# Patient Record
Sex: FEMALE | Race: WHITE | NOT HISPANIC OR LATINO | Employment: FULL TIME | URBAN - METROPOLITAN AREA
[De-identification: names, ages, dates, MRNs, and addresses within clinical notes are randomized per-mention and may not be internally consistent; named-entity substitution may affect disease eponyms.]

---

## 2018-06-19 PROBLEM — Z98.890 S/P GASTRIC SURGERY: Chronic | Status: ACTIVE | Noted: 2018-06-19

## 2018-08-07 PROBLEM — E78.00 ELEVATED LDL CHOLESTEROL LEVEL: Chronic | Status: ACTIVE | Noted: 2018-08-07

## 2019-02-28 PROBLEM — E55.9 VITAMIN D DEFICIENCY: Chronic | Status: ACTIVE | Noted: 2019-02-28

## 2019-02-28 PROBLEM — Z98.890 S/P COLONOSCOPY WITH POLYPECTOMY: Chronic | Status: ACTIVE | Noted: 2019-02-28

## 2021-04-26 ENCOUNTER — PATIENT MESSAGE (OUTPATIENT)
Dept: RESEARCH | Facility: HOSPITAL | Age: 61
End: 2021-04-26

## 2021-11-30 ENCOUNTER — TELEPHONE (OUTPATIENT)
Dept: ORTHOPEDICS | Facility: CLINIC | Age: 61
End: 2021-11-30

## 2021-12-01 ENCOUNTER — TELEPHONE (OUTPATIENT)
Dept: PODIATRY | Facility: CLINIC | Age: 61
End: 2021-12-01

## 2022-11-03 ENCOUNTER — TELEPHONE (OUTPATIENT)
Dept: OBSTETRICS AND GYNECOLOGY | Facility: CLINIC | Age: 62
End: 2022-11-03
Payer: MEDICAID

## 2022-11-03 NOTE — TELEPHONE ENCOUNTER
----- Message from Rosalva Landis MA sent at 11/3/2022  1:44 PM CDT -----  Regarding: pt appt  Dr. Huynh is referring the attached patient to you for evaluation and treatment. (Referral is in Epic System)    We appreciate your assistance in the patient's care and look forward to your findings and recommendations.  Please contact patient to set up an appointment.  If we can be of any assistance, please contact the office.        Sincerely,                           Audrey AGUDELO MA

## 2022-11-18 PROBLEM — R10.13 EPIGASTRIC PAIN: Status: ACTIVE | Noted: 2022-11-18

## 2022-11-22 PROBLEM — Z12.11 SCREEN FOR COLON CANCER: Status: ACTIVE | Noted: 2022-11-22

## 2023-02-22 ENCOUNTER — OFFICE VISIT (OUTPATIENT)
Dept: CARDIOLOGY | Facility: CLINIC | Age: 63
End: 2023-02-22
Payer: COMMERCIAL

## 2023-02-22 VITALS
WEIGHT: 222.69 LBS | HEART RATE: 58 BPM | OXYGEN SATURATION: 98 % | DIASTOLIC BLOOD PRESSURE: 59 MMHG | BODY MASS INDEX: 37.1 KG/M2 | HEIGHT: 65 IN | SYSTOLIC BLOOD PRESSURE: 134 MMHG

## 2023-02-22 DIAGNOSIS — R00.1 SINUS BRADYCARDIA: ICD-10-CM

## 2023-02-22 DIAGNOSIS — K80.80 BILIARY CALCULUS OF OTHER SITE WITHOUT OBSTRUCTION: ICD-10-CM

## 2023-02-22 DIAGNOSIS — E03.9 ACQUIRED HYPOTHYROIDISM: ICD-10-CM

## 2023-02-22 DIAGNOSIS — Z98.890 S/P GASTRIC SURGERY: Chronic | ICD-10-CM

## 2023-02-22 DIAGNOSIS — I70.0 ABDOMINAL AORTIC ATHEROSCLEROSIS: ICD-10-CM

## 2023-02-22 DIAGNOSIS — Z98.890 S/P COLONOSCOPY WITH POLYPECTOMY: Chronic | ICD-10-CM

## 2023-02-22 DIAGNOSIS — R00.1 BRADYCARDIA: ICD-10-CM

## 2023-02-22 DIAGNOSIS — I20.89 OTHER FORMS OF ANGINA PECTORIS: ICD-10-CM

## 2023-02-22 DIAGNOSIS — R06.09 DYSPNEA ON EXERTION: ICD-10-CM

## 2023-02-22 DIAGNOSIS — I44.7 LBBB (LEFT BUNDLE BRANCH BLOCK): Primary | ICD-10-CM

## 2023-02-22 DIAGNOSIS — E78.2 MIXED HYPERLIPIDEMIA: ICD-10-CM

## 2023-02-22 PROBLEM — K80.20 CHOLELITHIASIS: Status: ACTIVE | Noted: 2023-02-22

## 2023-02-22 PROCEDURE — 93000 EKG 12-LEAD: ICD-10-PCS | Mod: S$GLB,,, | Performed by: INTERNAL MEDICINE

## 2023-02-22 PROCEDURE — 3075F SYST BP GE 130 - 139MM HG: CPT | Mod: CPTII,S$GLB,, | Performed by: INTERNAL MEDICINE

## 2023-02-22 PROCEDURE — 99205 PR OFFICE/OUTPT VISIT, NEW, LEVL V, 60-74 MIN: ICD-10-PCS | Mod: S$GLB,,, | Performed by: INTERNAL MEDICINE

## 2023-02-22 PROCEDURE — 1159F PR MEDICATION LIST DOCUMENTED IN MEDICAL RECORD: ICD-10-PCS | Mod: CPTII,S$GLB,, | Performed by: INTERNAL MEDICINE

## 2023-02-22 PROCEDURE — 99999 PR PBB SHADOW E&M-EST. PATIENT-LVL III: CPT | Mod: PBBFAC,,, | Performed by: INTERNAL MEDICINE

## 2023-02-22 PROCEDURE — 3075F PR MOST RECENT SYSTOLIC BLOOD PRESS GE 130-139MM HG: ICD-10-PCS | Mod: CPTII,S$GLB,, | Performed by: INTERNAL MEDICINE

## 2023-02-22 PROCEDURE — 3078F DIAST BP <80 MM HG: CPT | Mod: CPTII,S$GLB,, | Performed by: INTERNAL MEDICINE

## 2023-02-22 PROCEDURE — 3078F PR MOST RECENT DIASTOLIC BLOOD PRESSURE < 80 MM HG: ICD-10-PCS | Mod: CPTII,S$GLB,, | Performed by: INTERNAL MEDICINE

## 2023-02-22 PROCEDURE — 1160F PR REVIEW ALL MEDS BY PRESCRIBER/CLIN PHARMACIST DOCUMENTED: ICD-10-PCS | Mod: CPTII,S$GLB,, | Performed by: INTERNAL MEDICINE

## 2023-02-22 PROCEDURE — 3008F PR BODY MASS INDEX (BMI) DOCUMENTED: ICD-10-PCS | Mod: CPTII,S$GLB,, | Performed by: INTERNAL MEDICINE

## 2023-02-22 PROCEDURE — 1159F MED LIST DOCD IN RCRD: CPT | Mod: CPTII,S$GLB,, | Performed by: INTERNAL MEDICINE

## 2023-02-22 PROCEDURE — 99999 PR PBB SHADOW E&M-EST. PATIENT-LVL III: ICD-10-PCS | Mod: PBBFAC,,, | Performed by: INTERNAL MEDICINE

## 2023-02-22 PROCEDURE — 99205 OFFICE O/P NEW HI 60 MIN: CPT | Mod: S$GLB,,, | Performed by: INTERNAL MEDICINE

## 2023-02-22 PROCEDURE — 93000 ELECTROCARDIOGRAM COMPLETE: CPT | Mod: S$GLB,,, | Performed by: INTERNAL MEDICINE

## 2023-02-22 PROCEDURE — 3008F BODY MASS INDEX DOCD: CPT | Mod: CPTII,S$GLB,, | Performed by: INTERNAL MEDICINE

## 2023-02-22 PROCEDURE — 1160F RVW MEDS BY RX/DR IN RCRD: CPT | Mod: CPTII,S$GLB,, | Performed by: INTERNAL MEDICINE

## 2023-02-22 RX ORDER — ALBUTEROL SULFATE 90 UG/1
AEROSOL, METERED RESPIRATORY (INHALATION)
COMMUNITY
Start: 2023-01-12 | End: 2023-06-29

## 2023-02-22 RX ORDER — ROSUVASTATIN CALCIUM 5 MG/1
5 TABLET, COATED ORAL DAILY
Qty: 90 TABLET | Refills: 3 | Status: SHIPPED | OUTPATIENT
Start: 2023-02-22 | End: 2023-05-04

## 2023-02-22 NOTE — PROGRESS NOTES
Subjective:      Patient ID: Destiny Carson is a 62 y.o. female.    Chief Complaint: Bradycardia (Ref by Dr Huynh)    HPI:  Pt referred by Dr Huynh for a slow pulse noted on routine physical exam.    Pt is not very active.    Pt works as a container .    Pt does a little yardwork..    There is no hx of heart disease.    Pt was dx with LBBB in 2017 and underwent a stress test which was OK.        Review of Systems   Cardiovascular:  Positive for dyspnea on exertion (Chronic, for example when carrying laundry up stairs.), palpitations (Heart occasionally races for a couple of minutes when at rest.) and syncope (Pt last fainted about 5 years associated with complications after a gastric bypass after taking a hot shower.). Negative for chest pain, claudication, irregular heartbeat, leg swelling, near-syncope and orthopnea.      Past Medical History:   Diagnosis Date    Hyperlipidemia     Syncope and collapse     Thyroid disease         Past Surgical History:   Procedure Laterality Date     SECTION      COLONOSCOPY  2022    COLONOSCOPY N/A 2022    Procedure: COLONOSCOPY;  Surgeon: Anders Wilson MD;  Location: [facility];  Service: Endoscopy;  Laterality: N/A;    ESOPHAGOGASTRODUODENOSCOPY      ESOPHAGOGASTRODUODENOSCOPY N/A 2022    Procedure: EGD (ESOPHAGOGASTRODUODENOSCOPY);  Surgeon: Anders Wilson MD;  Location: [facility];  Service: Endoscopy;  Laterality: N/A;    FOOT SURGERY Left     screws pins in foot.    gastric sleeve         Family History   Problem Relation Age of Onset    Diabetes Mother     Heart disease Mother     Dementia Mother     Heart attack Father     Cancer Father         rectal    Hypertension Father     Heart disease Father     Diabetes Father     Kidney disease Brother        Social History     Socioeconomic History    Marital status: Single   Occupational History    Occupation: data input for [employer]     Employer: [employer]  "  Tobacco Use    Smoking status: Former     Packs/day: 0.25     Years: 40.00     Pack years: 10.00     Types: Cigarettes     Quit date: 3/16/2016     Years since quittin.9    Smokeless tobacco: Former     Quit date: 3/16/2016   Substance and Sexual Activity    Alcohol use: Yes     Alcohol/week: 1.0 standard drink     Types: 1 Glasses of wine per week     Comment: occasionally    Drug use: No    Sexual activity: Never     Partners: Male     Birth control/protection: None       Current Outpatient Medications on File Prior to Visit   Medication Sig Dispense Refill    albuterol (PROVENTIL/VENTOLIN HFA) 90 mcg/actuation inhaler TAKE 1 SPRAYS (INHALATION) EVERY 4 HOURS AS NEEDED WHEEZING) FOR 7 DAYS      EScitalopram oxalate (LEXAPRO) 20 MG tablet Take 1 tablet (20 mg total) by mouth once daily. 90 tablet 0    levothyroxine (SYNTHROID) 25 MCG tablet Take 1 tablet (25 mcg total) by mouth before breakfast. 90 tablet 0    [DISCONTINUED] fenofibrate (TRICOR) 48 MG tablet Take 1 tablet (48 mg total) by mouth once daily. 90 tablet 0     Current Facility-Administered Medications on File Prior to Visit   Medication Dose Route Frequency Provider Last Rate Last Admin    lactated ringers infusion   Intravenous Continuous Anders Wilson MD        sodium chloride 0.9% flush 10 mL  10 mL Intravenous PRN Anders Wilson MD           Review of patient's allergies indicates:  No Known Allergies  Objective:     Vitals:    23 0815   BP: (!) 134/59   BP Location: Left arm   Patient Position: Sitting   BP Method: Large (Automatic)   Pulse: (!) 58   SpO2: 98%   Weight: 101 kg (222 lb 10.6 oz)   Height: 5' 5" (1.651 m)        Physical Exam  Vitals reviewed.   Constitutional:       Appearance: She is well-developed.   Eyes:      General: No scleral icterus.  Neck:      Vascular: No carotid bruit or JVD.   Cardiovascular:      Rate and Rhythm: Normal rate and regular rhythm.      Pulses:           Posterior tibial pulses are 2+ on the " right side and 2+ on the left side.      Heart sounds: No murmur heard.    No gallop.   Pulmonary:      Breath sounds: Normal breath sounds.   Musculoskeletal:      Right lower leg: Edema (trivial) present.      Left lower leg: Edema (trivial) present.   Skin:     General: Skin is warm and dry.   Neurological:      Mental Status: She is alert and oriented to person, place, and time.   Psychiatric:         Behavior: Behavior normal.         Thought Content: Thought content normal.         Judgment: Judgment normal.              ECG today: sinus bradycardia at 56 bpm, LBBB, reviewed by me, no significant change compared with ECG 2017     Chol HDL LDL CHOLc) TG   08/08/22 1704 172 44 91.4 183 Important    11/01/21 0707 156 48 77.0 155 Important    01/14/21 0000 211 Important  66 119 Important  146   US Lower Extremity Veins Bilateral  Status: Final result     MyChart Results Release    [employer] Status: Active  Results Release     PACS Images for [employer] Viewer     Show images for US Lower Extremity Veins Bilateral  All Reviewers List    Randell Huynh MD on 5/30/2016 20:53     US Lower Extremity Veins Bilateral  Order: [id number]  Status: Final result      Visible to patient: Yes (not seen)       Next appt: 04/27/2023 at 12:00 PM in Family Medicine (Randell Huynh MD)       Dx: Dependent edema       0 Result Notes      Details    Reading Physician Reading Date Result Priority   Real Merida MD  [phone]  [phone] 5/30/2016      Narrative & Impression  BILATERAL LOWER EXTREMITY VENOUS DOPPLER     Comparison: None     Technique: Grayscale, color Doppler, and spectral Doppler sonographic imaging of the right and left lower extremity venous systems was performed.     Findings: There is normal flow, compression, and augmentation in the veins of the right and left lower extremities from common femoral to calf veins.  IMPRESSION:    No evidence for deep vein thrombosis in the right or left lower  extremity.  ______________________________________      Electronically signed by: Real Merida MD  Date:                                            05/30/16  Time:                                           16:47      US Lower Extrem Arteries Bilat with KIRA (xpd)  Status: Final result     MyChart Results Release    [employer] Status: Active  Results Release     PACS Images for [employer] Viewer     Show images for US Lower Extrem Arteries Bilat with KIRA (xpd)  All Reviewers List    Randell Huynh MD on 5/30/2016 20:53     US Lower Extrem Arteries Bilat with KIRA (xpd)  Order: [id number]  Status: Final result      Visible to patient: Yes (not seen)       Next appt: 04/27/2023 at 12:00 PM in Family Medicine (Randell Huynh MD)       Dx: Dependent edema       0 Result Notes      Details    Reading Physician Reading Date Result Priority   Real Merida MD  [phone]  [phone] 5/30/2016      Narrative & Impression  Bilateral lower extremity arterial Doppler examination with bilateral ankle brachial indices     No comparison     Findings: Multiphasic waveforms are present throughout the right lower extremity from common femoral through the calf arteries.  No high-grade stenosis is identified.  The ankle brachial index is normal at 1.1.     Multiphasic waveforms are present throughout the arteries of the left lower extremity from common femoral through calf arteries.  No significant focal stenosis identified.  The ankle brachial index is normal at 1.1.  IMPRESSION:    No evidence for significant lower extremity arterial obstructive disease.  ______________________________________      Electronically signed by: Real Merida MD  Date:                                            05/30/16     CT Abdomen Pelvis With Contrast  Status: Final result     [employer] Results Release    [employer] Status: Active  Results Release     PACS Images for [facility] [place] Viewer     Show images for CT Abdomen Pelvis With  Contrast  All Reviewers List    Sukhdeep Mata III, MD on 7/31/2022 20:00     CT Abdomen Pelvis With Contrast  Order: [id number]  Status: Final result      Visible to patient: Yes (not seen)       Next appt: 04/27/2023 at 12:00 PM in Family Medicine (Randell Huynh MD)       0 Result Notes      Details    Reading Physician Reading Date Result Priority   Mitchel Lyman MD  [phone]  [phone] 7/31/2022 STAT     Narrative & Impression  EXAMINATION:  CT ABDOMEN PELVIS WITH CONTRAST     CLINICAL HISTORY:  Epigastric pain;     TECHNIQUE:  Low dose axial images, sagittal and coronal reformations were obtained from the lung bases to the pubic symphysis following the IV administration of 75 mL of Omnipaque 350     COMPARISON:  None.     FINDINGS:  Lower chest: Unremarkable.     Liver: Normal contour.  Mild degree of periportal edema suggested.     Gallbladder and bile ducts: Cholelithiasis.  No definite focal pericholecystic inflammatory change.  No findings to suggest biliary ductal dilatation.     Pancreas: Unremarkable.     Spleen: Unremarkable.     Adrenals: Unremarkable.     Kidneys: Nonspecific minor perinephric stranding.     Lymph nodes: No abdominal or pelvic lymphadenopathy.     Bowel and mesentery: Hiatal hernia.  Postoperative changes of the stomach suggest prior sleeve gastrectomy.  No definite evidence of acute bowel obstruction.  Moderate to large volume colonic stool.  Colonic diverticulosis is suggested no definite evidence of diverticulitis.Normal caliber appendix.     Abdominal aorta: Nonaneurysmal.  Atherosclerosis.     Inferior vena cava: Unremarkable.     Free fluid or free air: None.     Pelvis: Unremarkable.     Body wall: Unremarkable.     Bones: No acute abnormality.  Degenerative findings are noted involving the spine.     Impression:     1. Cholelithiasis.  No definite CT evidence of acute cholecystitis.  Correlation advised.  2. Additional details of chronic and incidental  findings as provided in the body of report.        Electronically signed by: Mitchel Lyman  Date:                                            07/31/2022       ComponentRef Range & Units6 mo ago  (8/8/22)6 mo ago  (7/31/22)1 yr ago  (11/1/21)2 yr ago  (1/14/21)4 yr ago  (6/28/18)5 yr ago  (11/30/17)6 yr ago  (2/17/17)WBC3.90 - 12.70 K/uL   8.74   13.16 High    7.60   6.9 R   7.30   6.7 R   7.6 R   RBC4.00 - 5.40 M/uL   4.30   4.38   4.49   4.97 R   4.72   4.54 R   4.47 R   Rgfoaqnrqv73.0 - 16.0 g/dL   12.8   13.1   13.4   14.2 R   14.1   13.3 R   13.0 R   Eahaukpqym36.0 - 48.5 %   39.6   40.5   41.1   44.9 R   41.7   40.1 R   38.3 R   MCV82 - 98 fL   92   93   92   90.3 R   88   88.3 R   85.8 R   MCH27.0 - 31.0 pg   29.8   29.9   29.8   28.6 R   29.8   29.3 R   29.1 R   MCHC32.0 - 36.0 g/dL   32.3   32.3   32.6   31.6 Low    33.8   33.2   33.9   RDW11.5 - 14.5 %   12.0   12.3   12.6   12.6 R   12.9   13.8 R   14.0 R   Klgitkitj564 - 450 K/uL   327   265   282   296 R   283 R   301 R   345 R   MPV9.2 - 12.9 fL   10.0   10.1   10.0   10.5 R   8.2 Low    10.8 R   8.4 R   Immature Granulocytes0.0 - 0.5 %   0.3   0.2   0.3   Gran # (ANC)1.8 - 7.7 K/uL   5.5   11.6 High    4.5   4.4   Immature Grans (Abs)0.00 - 0.04 K/uL   0.03   0.03 CM   0.02 CM   Comment: Mild elevation in immature granulocytes is non specific and   can be seen in a variety of conditions including stress response,   acute inflammation, trauma and pregnancy. Correlation with other   laboratory and clinical findings is essential.   Lymph #1.0 - 4.8 K/uL   2.2   1.1   2.4   2,180 R   2.1   2,144 R   1,892 R   Mono #0.3 - 1.0 K/uL   0.5   0.3   0.5   449 R   0.5   402 R   357 R   Eos #0.0 - 0.5 K/uL   0.5   0.1   0.2   221 R   0.2   174 R   228 R   Baso #0.00 - 0.20 K/uL   0.04 0 Result Notes             Component Ref Range & Units 6 mo ago  (8/8/22) 6 mo ago  (7/31/22) 1 yr ago  (11/1/21) 2 yr ago  (1/14/21) 3 yr ago  (1/21/20) 4 yr ago  (6/28/18) 5 yr  ago  (11/30/17)   Sodium 136 - 145 mmol/L 139  142  145  142 R  144 R  140  143 R    Potassium 3.5 - 5.1 mmol/L 3.9  4.1  4.0  4.3 R  5.2 R  3.8  4.3 R    Chloride 95 - 110 mmol/L 103  106  110  104 R  105 R  104 R  106 R    CO2 23 - 29 mmol/L 27  23  28  31 R  31 R  30 High   27 R    Glucose 70 - 110 mg/dL 95  164 High   91  97 R, CM  95 R, CM  91 R  92 R, CM    BUN 8 - 23 mg/dL 13  14  12 R  17 R  16 R  17 R  14 R    Creatinine 0.5 - 1.4 mg/dL 1.0  1.1  1.1  0.99 R, CM  1.00 R, CM  1.0  0.96 R, CM    Calcium 8.7 - 10.5 mg/dL 9.1  8.7  9.5  9.5 R  9.5 R  8.9 R  9.4 R    Total Protein 6.0 - 8.4 g/dL 7.7  7.0  7.1  7.1 R  6.9 R  7.2  7.1 R    Albumin 3.5 - 5.2 g/dL 3.5  3.5  3.6  4.0 R  4.0 R  3.8  4.0 R    Total Bilirubin 0.1 - 1.0 mg/dL 0.5  0.3 CM  0.4 CM  0.5 R  0.4 R  0.7 R, CM  0.5 R    Comment: For infants and newborns, interpretation of results should be based   on gestational age, weight and in agreement with clinical   observations.     Premature Infant recommended reference ranges:         MyChart Status: Active  Results Release       Contains abnormal data Lipid panel  Order: [id number]  Status: Final result    Visible to patient: Yes (not seen)     Next appt: 04/27/2023 at 12:00 PM in Family Medicine (Randell Huynh MD)     Dx: Elevated cholesterol     0 Result Notes    1  Topic             Component Ref Range & Units 6 mo ago 1 yr ago 2 yr ago 3 yr ago 4 yr ago 5 yr ago 6 yr ago   Cholesterol 120 - 199 mg/dL 172  156 CM  211 High  R  176 R  180 R, CM  160 R  153 R    Comment: The National Cholesterol Education Program (NCEP) has set the   following guidelines (reference ranges) for Cholesterol:   Optimal.....................<200 mg/dL   Borderline High.............200-239 mg/dL   High........................> or = 240 mg/dL    Triglycerides 30 - 150 mg/dL 183 High   155 High  CM  146 R  115 R  84 CM  143 R  96 R    Comment: The National Cholesterol Education Program (NCEP) has set the   following  guidelines (reference values) for triglycerides:   Normal......................<150 mg/dL   Borderline High.............150-199 mg/dL   High........................200-499 mg/dL    HDL 40 - 75 mg/dL 44  48 CM  66 R  61 R  58 CM  51 R  59 R    Comment: The National Cholesterol Education Program (NCEP) has set the   following guidelines (reference values) for HDL Cholesterol:   Low...............<40 mg/dL   Optimal...........>60 mg/dL    LDL Cholesterol 63.0 - 159.0 mg/dL 91.4  77.0 CM  119 High  R, CM  94 R, CM  105 High  R, CM  85 R, CM  75 R, CM    Comment: The National Cholesterol Education Program (NCEP) has set the   following guidelines (reference values) for LDL Cholesterol:   Optimal.......................<130 mg/dL                    Component Ref Range & Units 6 mo ago 1 yr ago 2 yr ago 3 yr ago 4 yr ago 5 yr ago 8 yr ago   TSH 0.400 - 4.000 uIU/mL 2.748  4.378 High   4.57 High  R  2.86 R  3.81 R  1.99 R  3.83 R, CM    Resulting Agency  [facility]              Specimen Collected: 08/08/22 17:04 Last Resulted: 08/08/22 17:51        Lab Flowsheet     Order Details     View Encounter     Lab and Collection Details     Routing    Result History     View Encounter Conversation       CM=Additional comments  R=Reference range differs from displayed range               0 Result Notes    1 HM Topic          Component Ref Range & Units 6 mo ago 1 yr ago 2 yr ago 3 yr ago   Hemoglobin A1C 4.0 - 5.6 % 5.0  5.2 CM  5.2 R, CM  5.3 R, CM    Comment: ADA Screening Guidelines:   5.7-6.4%  Consistent with prediabetes   >or=6.5%  Consistent with diabetes     High levels of fetal hemoglobin interfere with the HbA1C   assay. Heterozygous hemoglobin variants (HbS, HgC, etc)do   not significantly interfere with this assay.          Chol HDL LDL CHOLc) TG   08/08/22 1704 172 44 91.4 183 Important    11/01/21 0707 156 48 77.0 155 Important    01/14/21 0000 211 Important  66 119  Important  146   US Lower Extremity Veins Bilateral  Status: Final result     MyChart Results Release    [employer] Status: Active  Results Release     PACS Images for [facility] [place] Viewer     Show images for US Lower Extremity Veins Bilateral  All Reviewers List    Randell Huynh MD on 5/30/2016 20:53     US Lower Extremity Veins Bilateral  Order: [id number]  Status: Final result      Visible to patient: Yes (not seen)       Next appt: 04/27/2023 at 12:00 PM in Family Medicine (Randell Huynh MD)       Dx: Dependent edema       0 Result Notes      Details    Reading Physician Reading Date Result Priority   Real Merida MD  [phone]  [phone] 5/30/2016      Narrative & Impression  BILATERAL LOWER EXTREMITY VENOUS DOPPLER     Comparison: None     Technique: Grayscale, color Doppler, and spectral Doppler sonographic imaging of the right and left lower extremity venous systems was performed.     Findings: There is normal flow, compression, and augmentation in the veins of the right and left lower extremities from common femoral to calf veins.  IMPRESSION:    No evidence for deep vein thrombosis in the right or left lower extremity.  ______________________________________      Electronically signed by: Real Merida MD  Date:                                            05/30/16  Time:                                           16:47      US Lower Extrem Arteries Bilat with KIRA (xpd)  Status: Final result     MyChart Results Release    [employer] Status: Active  Results Release     PACS Images for [facility] [place] Viewer     Show images for US Lower Extrem Arteries Bilat with KIRA (xpd)  All Reviewers List    Randell Huynh MD on 5/30/2016 20:53     US Lower Extrem Arteries Bilat with KIRA (xpd)  Order: [id number]  Status: Final result      Visible to patient: Yes (not seen)       Next appt: 04/27/2023 at 12:00 PM in Family Medicine (Randell Huynh MD)       Dx: Dependent edema       0 Result  Notes      Details    Reading Physician Reading Date Result Priority   Real Merida MD  [phone]  [phone] 5/30/2016      Narrative & Impression  Bilateral lower extremity arterial Doppler examination with bilateral ankle brachial indices     No comparison     Findings: Multiphasic waveforms are present throughout the right lower extremity from common femoral through the calf arteries.  No high-grade stenosis is identified.  The ankle brachial index is normal at 1.1.     Multiphasic waveforms are present throughout the arteries of the left lower extremity from common femoral through calf arteries.  No significant focal stenosis identified.  The ankle brachial index is normal at 1.1.  IMPRESSION:    No evidence for significant lower extremity arterial obstructive disease.  ______________________________________      Electronically signed by: Real Merida MD  Date:                                            05/30/16     CT Abdomen Pelvis With Contrast  Status: Final result     MyChart Results Release    [employer] Status: Active  Results Release     PACS Images for [employer] Viewer     Show images for CT Abdomen Pelvis With Contrast  All Reviewers List    Sukhdeep Mata III, MD on 7/31/2022 20:00     CT Abdomen Pelvis With Contrast  Order: [id number]  Status: Final result      Visible to patient: Yes (not seen)       Next appt: 04/27/2023 at 12:00 PM in Family Medicine (Randell Huynh MD)       0 Result Notes      Details    Reading Physician Reading Date Result Priority   Mitchel Lyman MD  [phone]  [phone] 7/31/2022 STAT     Narrative & Impression  EXAMINATION:  CT ABDOMEN PELVIS WITH CONTRAST     CLINICAL HISTORY:  Epigastric pain;     TECHNIQUE:  Low dose axial images, sagittal and coronal reformations were obtained from the lung bases to the pubic symphysis following the IV administration of 75 mL of Omnipaque 350     COMPARISON:  None.     FINDINGS:  Lower chest:  Unremarkable.     Liver: Normal contour.  Mild degree of periportal edema suggested.     Gallbladder and bile ducts: Cholelithiasis.  No definite focal pericholecystic inflammatory change.  No findings to suggest biliary ductal dilatation.     Pancreas: Unremarkable.     Spleen: Unremarkable.     Adrenals: Unremarkable.     Kidneys: Nonspecific minor perinephric stranding.     Lymph nodes: No abdominal or pelvic lymphadenopathy.     Bowel and mesentery: Hiatal hernia.  Postoperative changes of the stomach suggest prior sleeve gastrectomy.  No definite evidence of acute bowel obstruction.  Moderate to large volume colonic stool.  Colonic diverticulosis is suggested no definite evidence of diverticulitis.Normal caliber appendix.     Abdominal aorta: Nonaneurysmal.  Atherosclerosis.     Inferior vena cava: Unremarkable.     Free fluid or free air: None.     Pelvis: Unremarkable.     Body wall: Unremarkable.     Bones: No acute abnormality.  Degenerative findings are noted involving the spine.     Impression:     1. Cholelithiasis.  No definite CT evidence of acute cholecystitis.  Correlation advised.  2. Additional details of chronic and incidental findings as provided in the body of report.        Electronically signed by: Mitchel Lyman  Date:                                            07/31/2022         Assessment:     1. LBBB (left bundle branch block)    2. Bradycardia    3. Dyspnea on exertion    4. Sinus bradycardia    5. Acquired hypothyroidism    6. Mixed hyperlipidemia    7. S/P gastric surgery    8. S/P colonoscopy with polypectomy    9. Biliary calculus of other site without obstruction    10. Abdominal aortic atherosclerosis    11. Other forms of angina pectoris      Plan:   Destiny was seen today for bradycardia.    Diagnoses and all orders for this visit:    LBBB (left bundle branch block)  -     IN OFFICE EKG 12-LEAD (to Muse)  -     NM Myocardial Perfusion Spect Multi Pharmacologic; Future  -     Nuclear  Stress Test; Future  -     Holter monitor - 48 hour; Future    Bradycardia  -     Ambulatory referral/consult to Cardiology  -     IN OFFICE EKG 12-LEAD (to Muse)  -     NM Myocardial Perfusion Spect Multi Pharmacologic; Future  -     Nuclear Stress Test; Future  -     Holter monitor - 48 hour; Future    Dyspnea on exertion  -     IN OFFICE EKG 12-LEAD (to Muse)  -     NM Myocardial Perfusion Spect Multi Pharmacologic; Future  -     Nuclear Stress Test; Future  -     Holter monitor - 48 hour; Future    Sinus bradycardia  -     IN OFFICE EKG 12-LEAD (to Muse)  -     NM Myocardial Perfusion Spect Multi Pharmacologic; Future  -     Nuclear Stress Test; Future  -     Holter monitor - 48 hour; Future    Acquired hypothyroidism  -     IN OFFICE EKG 12-LEAD (to Muse)  -     NM Myocardial Perfusion Spect Multi Pharmacologic; Future  -     Nuclear Stress Test; Future  -     Holter monitor - 48 hour; Future    Mixed hyperlipidemia  -     IN OFFICE EKG 12-LEAD (to Muse)  -     NM Myocardial Perfusion Spect Multi Pharmacologic; Future  -     Nuclear Stress Test; Future  -     Holter monitor - 48 hour; Future    S/P gastric surgery  -     IN OFFICE EKG 12-LEAD (to Muse)  -     NM Myocardial Perfusion Spect Multi Pharmacologic; Future  -     Nuclear Stress Test; Future  -     Holter monitor - 48 hour; Future    S/P colonoscopy with polypectomy  -     IN OFFICE EKG 12-LEAD (to Muse)  -     NM Myocardial Perfusion Spect Multi Pharmacologic; Future  -     Nuclear Stress Test; Future  -     Holter monitor - 48 hour; Future    Biliary calculus of other site without obstruction  -     NM Myocardial Perfusion Spect Multi Pharmacologic; Future  -     Nuclear Stress Test; Future  -     Holter monitor - 48 hour; Future    Abdominal aortic atherosclerosis  -     NM Myocardial Perfusion Spect Multi Pharmacologic; Future  -     Nuclear Stress Test; Future  -     Holter monitor - 48 hour; Future    Other forms of angina pectoris  -     NM  Myocardial Perfusion Spect Multi Pharmacologic; Future    Other orders  -     rosuvastatin (CRESTOR) 5 MG tablet; Take 1 tablet (5 mg total) by mouth once daily.    The chronic dyspnea on exertion may simply be due to deconditioning, but due to LBBB and hx of smoking and hyperlipidemia and due to abdominal aortic atherosclerosis will obtain a Lexiscan Cardiolite stress test and a holter monitor.    Due to gallstones will discontinue the fenofibrate and substitute rosuvastatin 5 mg daily    RTC 2 months with lab in 6 weeks already ordered by Dr Huynh    Low carb diet discussed    Walk more    Follow up in about 2 months (around 4/22/2023).

## 2023-06-02 ENCOUNTER — TELEPHONE (OUTPATIENT)
Dept: CARDIOLOGY | Facility: CLINIC | Age: 63
End: 2023-06-02
Payer: COMMERCIAL

## 2023-06-02 NOTE — TELEPHONE ENCOUNTER
----- Message from Nina Franco sent at 6/2/2023  2:45 PM CDT -----  Contact: pt @ [phone]  Destiny Carson calling regarding Appointment Access  (message) for #pt is calling to get 3 part stress test schedule pt was schedule 3/6 but had to cancel,and needs to have test done before 6/12. Asking for call back

## 2023-06-02 NOTE — TELEPHONE ENCOUNTER
LVM for patient, returning your call regarding rescheduling your nuclear stress test.  You will need to call [facility] scheduling at [phone].

## 2023-06-05 ENCOUNTER — TELEPHONE (OUTPATIENT)
Dept: CARDIOLOGY | Facility: CLINIC | Age: 63
End: 2023-06-05
Payer: COMMERCIAL

## 2023-06-05 NOTE — TELEPHONE ENCOUNTER
----- Message from Shahana Hensley sent at 6/5/2023  8:27 AM CDT -----  Regarding: Patient Missed Returning Call  Patient is returning missed call . Pts call back- [phone]

## 2023-06-05 NOTE — TELEPHONE ENCOUNTER
Spoke with patient, informed that [facility] is currently not scheduling patients for nuclear stress and will need to schedule at another location.  Patient verbalized understanding, gave number for [facility] ([phone]).  Patient verbalized understanding and will call in the morning.

## 2023-06-19 ENCOUNTER — HOSPITAL ENCOUNTER (OUTPATIENT)
Dept: CARDIOLOGY | Facility: HOSPITAL | Age: 63
Discharge: HOME | End: 2023-06-19
Attending: INTERNAL MEDICINE
Payer: COMMERCIAL

## 2023-06-19 ENCOUNTER — HOSPITAL ENCOUNTER (OUTPATIENT)
Dept: RADIOLOGY | Facility: HOSPITAL | Age: 63
Discharge: HOME | End: 2023-06-19
Attending: INTERNAL MEDICINE
Payer: COMMERCIAL

## 2023-06-19 ENCOUNTER — TELEPHONE (OUTPATIENT)
Dept: CARDIOLOGY | Facility: CLINIC | Age: 63
End: 2023-06-19

## 2023-06-19 VITALS — HEIGHT: 65 IN | WEIGHT: 220 LBS | BODY MASS INDEX: 36.65 KG/M2

## 2023-06-19 DIAGNOSIS — E78.2 MIXED HYPERLIPIDEMIA: ICD-10-CM

## 2023-06-19 DIAGNOSIS — R00.1 BRADYCARDIA: ICD-10-CM

## 2023-06-19 DIAGNOSIS — I44.7 LBBB (LEFT BUNDLE BRANCH BLOCK): ICD-10-CM

## 2023-06-19 DIAGNOSIS — R06.09 DYSPNEA ON EXERTION: ICD-10-CM

## 2023-06-19 DIAGNOSIS — Z98.890 S/P COLONOSCOPY WITH POLYPECTOMY: ICD-10-CM

## 2023-06-19 DIAGNOSIS — K80.80 BILIARY CALCULUS OF OTHER SITE WITHOUT OBSTRUCTION: ICD-10-CM

## 2023-06-19 DIAGNOSIS — Z98.890 S/P COLONOSCOPY WITH POLYPECTOMY: Chronic | ICD-10-CM

## 2023-06-19 DIAGNOSIS — R00.1 SINUS BRADYCARDIA: ICD-10-CM

## 2023-06-19 DIAGNOSIS — I70.0 ABDOMINAL AORTIC ATHEROSCLEROSIS: ICD-10-CM

## 2023-06-19 DIAGNOSIS — E03.9 ACQUIRED HYPOTHYROIDISM: ICD-10-CM

## 2023-06-19 DIAGNOSIS — Z98.890 S/P GASTRIC SURGERY: Chronic | ICD-10-CM

## 2023-06-19 DIAGNOSIS — Z98.890 S/P GASTRIC SURGERY: ICD-10-CM

## 2023-06-19 DIAGNOSIS — R94.39 ABNORMAL NUCLEAR STRESS TEST: Primary | ICD-10-CM

## 2023-06-19 DIAGNOSIS — I20.89 OTHER FORMS OF ANGINA PECTORIS: ICD-10-CM

## 2023-06-19 LAB
CV PHARM DOSE: 0.4 MG
CV STRESS BASE HR: 44 BPM
DIASTOLIC BLOOD PRESSURE: 69 MMHG
OHS CV CPX 85 PERCENT MAX PREDICTED HEART RATE MALE: 129
OHS CV CPX MAX PREDICTED HEART RATE: 151
OHS CV CPX PATIENT IS FEMALE: 1
OHS CV CPX PATIENT IS MALE: 0
OHS CV CPX PEAK DIASTOLIC BLOOD PRESSURE: 69 MMHG
OHS CV CPX PEAK HEAR RATE: 87 BPM
OHS CV CPX PEAK RATE PRESSURE PRODUCT: NORMAL
OHS CV CPX PEAK SYSTOLIC BLOOD PRESSURE: 145 MMHG
OHS CV CPX PERCENT MAX PREDICTED HEART RATE ACHIEVED: 57
OHS CV CPX RATE PRESSURE PRODUCT PRESENTING: 6380
SYSTOLIC BLOOD PRESSURE: 145 MMHG

## 2023-06-19 PROCEDURE — 93018 CV STRESS TEST I&R ONLY: CPT | Mod: ,,, | Performed by: INTERNAL MEDICINE

## 2023-06-19 PROCEDURE — 93016 CV STRESS TEST SUPVJ ONLY: CPT | Mod: ,,, | Performed by: INTERNAL MEDICINE

## 2023-06-19 PROCEDURE — 93017 CV STRESS TEST TRACING ONLY: CPT

## 2023-06-19 PROCEDURE — 78452 HT MUSCLE IMAGE SPECT MULT: CPT | Mod: TC

## 2023-06-19 PROCEDURE — A9502 TC99M TETROFOSMIN: HCPCS

## 2023-06-19 PROCEDURE — 93016 NUCLEAR STRESS TEST (CUPID ONLY): ICD-10-PCS | Mod: ,,, | Performed by: INTERNAL MEDICINE

## 2023-06-19 PROCEDURE — 93018 NUCLEAR STRESS TEST (CUPID ONLY): ICD-10-PCS | Mod: ,,, | Performed by: INTERNAL MEDICINE

## 2023-06-19 PROCEDURE — 78452 HT MUSCLE IMAGE SPECT MULT: CPT | Mod: 26,,, | Performed by: RADIOLOGY

## 2023-06-19 PROCEDURE — 78452 NM MYOCARDIAL PERFUSION SPECT MULTI PHARM: ICD-10-PCS | Mod: 26,,, | Performed by: RADIOLOGY

## 2023-06-19 RX ORDER — REGADENOSON 0.08 MG/ML
0.4 INJECTION, SOLUTION INTRAVENOUS ONCE
Status: DISCONTINUED | OUTPATIENT
Start: 2023-06-19 | End: 2023-06-27 | Stop reason: HOSPADM

## 2023-06-20 ENCOUNTER — TELEPHONE (OUTPATIENT)
Dept: CARDIOLOGY | Facility: CLINIC | Age: 63
End: 2023-06-20
Payer: COMMERCIAL

## 2023-06-20 NOTE — TELEPHONE ENCOUNTER
Spoke with patient, referred by Dr. Chance for abnormal stress test to Dr. Ng.  Scheduled appointment per patient preference.

## 2023-06-20 NOTE — TELEPHONE ENCOUNTER
----- Message from Nina Franco sent at 6/20/2023 12:27 PM CDT -----  Contact: pt @ [phone]  DORCAS WATTS calling regarding Patient Advice (message) for #pt is calling to get np appt, pt has referral in chart still in pending review. Asking for call back

## 2023-06-29 ENCOUNTER — OFFICE VISIT (OUTPATIENT)
Dept: CARDIOLOGY | Facility: CLINIC | Age: 63
End: 2023-06-29
Payer: COMMERCIAL

## 2023-06-29 VITALS
BODY MASS INDEX: 37.25 KG/M2 | OXYGEN SATURATION: 99 % | HEIGHT: 65 IN | WEIGHT: 223.56 LBS | HEART RATE: 62 BPM | DIASTOLIC BLOOD PRESSURE: 84 MMHG | SYSTOLIC BLOOD PRESSURE: 136 MMHG

## 2023-06-29 DIAGNOSIS — E78.2 MIXED HYPERLIPIDEMIA: ICD-10-CM

## 2023-06-29 DIAGNOSIS — R00.1 SINUS BRADYCARDIA: ICD-10-CM

## 2023-06-29 DIAGNOSIS — I70.0 ABDOMINAL AORTIC ATHEROSCLEROSIS: Primary | ICD-10-CM

## 2023-06-29 DIAGNOSIS — R94.39 ABNORMAL CARDIOVASCULAR STRESS TEST: ICD-10-CM

## 2023-06-29 DIAGNOSIS — I44.7 LBBB (LEFT BUNDLE BRANCH BLOCK): ICD-10-CM

## 2023-06-29 DIAGNOSIS — R94.39 ABNORMAL NUCLEAR STRESS TEST: ICD-10-CM

## 2023-06-29 DIAGNOSIS — E78.5 HYPERLIPIDEMIA, UNSPECIFIED HYPERLIPIDEMIA TYPE: ICD-10-CM

## 2023-06-29 DIAGNOSIS — R06.09 DYSPNEA ON EXERTION: ICD-10-CM

## 2023-06-29 PROCEDURE — 3079F DIAST BP 80-89 MM HG: CPT | Mod: CPTII,S$GLB,, | Performed by: INTERNAL MEDICINE

## 2023-06-29 PROCEDURE — 99999 PR PBB SHADOW E&M-EST. PATIENT-LVL IV: CPT | Mod: PBBFAC,,, | Performed by: INTERNAL MEDICINE

## 2023-06-29 PROCEDURE — 99204 PR OFFICE/OUTPT VISIT, NEW, LEVL IV, 45-59 MIN: ICD-10-PCS | Mod: S$GLB,,, | Performed by: INTERNAL MEDICINE

## 2023-06-29 PROCEDURE — 3079F PR MOST RECENT DIASTOLIC BLOOD PRESSURE 80-89 MM HG: ICD-10-PCS | Mod: CPTII,S$GLB,, | Performed by: INTERNAL MEDICINE

## 2023-06-29 PROCEDURE — 99204 OFFICE O/P NEW MOD 45 MIN: CPT | Mod: S$GLB,,, | Performed by: INTERNAL MEDICINE

## 2023-06-29 PROCEDURE — 3008F BODY MASS INDEX DOCD: CPT | Mod: CPTII,S$GLB,, | Performed by: INTERNAL MEDICINE

## 2023-06-29 PROCEDURE — 3008F PR BODY MASS INDEX (BMI) DOCUMENTED: ICD-10-PCS | Mod: CPTII,S$GLB,, | Performed by: INTERNAL MEDICINE

## 2023-06-29 PROCEDURE — 3075F SYST BP GE 130 - 139MM HG: CPT | Mod: CPTII,S$GLB,, | Performed by: INTERNAL MEDICINE

## 2023-06-29 PROCEDURE — 1159F PR MEDICATION LIST DOCUMENTED IN MEDICAL RECORD: ICD-10-PCS | Mod: CPTII,S$GLB,, | Performed by: INTERNAL MEDICINE

## 2023-06-29 PROCEDURE — 3044F PR MOST RECENT HEMOGLOBIN A1C LEVEL <7.0%: ICD-10-PCS | Mod: CPTII,S$GLB,, | Performed by: INTERNAL MEDICINE

## 2023-06-29 PROCEDURE — 3044F HG A1C LEVEL LT 7.0%: CPT | Mod: CPTII,S$GLB,, | Performed by: INTERNAL MEDICINE

## 2023-06-29 PROCEDURE — 1160F PR REVIEW ALL MEDS BY PRESCRIBER/CLIN PHARMACIST DOCUMENTED: ICD-10-PCS | Mod: CPTII,S$GLB,, | Performed by: INTERNAL MEDICINE

## 2023-06-29 PROCEDURE — 99999 PR PBB SHADOW E&M-EST. PATIENT-LVL IV: ICD-10-PCS | Mod: PBBFAC,,, | Performed by: INTERNAL MEDICINE

## 2023-06-29 PROCEDURE — 3075F PR MOST RECENT SYSTOLIC BLOOD PRESS GE 130-139MM HG: ICD-10-PCS | Mod: CPTII,S$GLB,, | Performed by: INTERNAL MEDICINE

## 2023-06-29 PROCEDURE — 1160F RVW MEDS BY RX/DR IN RCRD: CPT | Mod: CPTII,S$GLB,, | Performed by: INTERNAL MEDICINE

## 2023-06-29 PROCEDURE — 1159F MED LIST DOCD IN RCRD: CPT | Mod: CPTII,S$GLB,, | Performed by: INTERNAL MEDICINE

## 2023-06-29 NOTE — PROGRESS NOTES
[facility]  Cardiology Clinic Note      Chief Complaint  Chief Complaint   Patient presents with    Follow-up     Abnormal nuclear stress test 2023       HPI:      62-year-old female with past medical history status post gastric sleeve, syncope, hypothyroidism, left bundle branch block, prior tobacco, hyperlipidemia, hypertension, MPI reversible apical defect normal EF    Previously seen by another [facility] cardiologist but is new me  Sent for consideration of angiogram given MPI results  Recent labs reviewed  The patient has been having dyspnea on exertion over the past few months associated with occasional chest discomfort at work  The symptoms have been going on for few months  Chest discomfort is not reproducible, not positional, not worse after p.o. or when supine.  Discussed risks and benefits of coronary angiogram and explained the procedure in detail      Medications  Current Outpatient Medications   Medication Sig Dispense Refill    ergocalciferol (VITAMIN D2) 50,000 unit Cap Take 1 capsule (50,000 Units total) by mouth every 7 days. 12 capsule 0    EScitalopram oxalate (LEXAPRO) 20 MG tablet Take 1 tablet (20 mg total) by mouth once daily. 90 tablet 0    levothyroxine (SYNTHROID) 25 MCG tablet Take 1 tablet (25 mcg total) by mouth before breakfast. 90 tablet 0     No current facility-administered medications for this visit.     Facility-Administered Medications Ordered in Other Visits   Medication Dose Route Frequency Provider Last Rate Last Admin    lactated ringers infusion   Intravenous Continuous Anders Wilson MD        sodium chloride 0.9% flush 10 mL  10 mL Intravenous PRN Anders Wilson MD            History  Past Medical History:   Diagnosis Date    Hyperlipidemia     Syncope and collapse     Thyroid disease      Past Surgical History:   Procedure Laterality Date     SECTION      COLONOSCOPY  2022    COLONOSCOPY N/A 2022    Procedure: COLONOSCOPY;   Surgeon: Anders Wilson MD;  Location: [facility] ENDO;  Service: Endoscopy;  Laterality: N/A;    ESOPHAGOGASTRODUODENOSCOPY      ESOPHAGOGASTRODUODENOSCOPY N/A 2022    Procedure: EGD (ESOPHAGOGASTRODUODENOSCOPY);  Surgeon: Anders Wilson MD;  Location: [facility] ENDO;  Service: Endoscopy;  Laterality: N/A;    FOOT SURGERY Left     screws pins in foot.    gastric sleeve       Social History     Socioeconomic History    Marital status: Single   Occupational History    Occupation: data input for [employer]     Employer: [employer]   Tobacco Use    Smoking status: Former     Packs/day: 0.25     Years: 40.00     Pack years: 10.00     Types: Cigarettes     Quit date: 3/16/2016     Years since quittin.2    Smokeless tobacco: Former     Quit date: 3/16/2016   Substance and Sexual Activity    Alcohol use: Yes     Alcohol/week: 1.0 standard drink     Types: 1 Glasses of wine per week     Comment: occasionally    Drug use: No    Sexual activity: Never     Partners: Male     Birth control/protection: None     Social Determinants of Health     Financial Resource Strain: Medium Risk    Difficulty of Paying Living Expenses: Somewhat hard   Food Insecurity: No Food Insecurity    Worried About Running Out of Food in the Last Year: Never true    Ran Out of Food in the Last Year: Never true   Transportation Needs: No Transportation Needs    Lack of Transportation (Medical): No    Lack of Transportation (Non-Medical): No   Physical Activity: Insufficiently Active    Days of Exercise per Week: 2 days    Minutes of Exercise per Session: 20 min   Stress: Stress Concern Present    Feeling of Stress : Very much   Social Connections: Unknown    Frequency of Communication with Friends and Family: More than three times a week    Frequency of Social Gatherings with Friends and Family: More than three times a week    Active Member of Clubs or Organizations: Yes    Attends Club or Organization Meetings: 1 to 4 times per year     Marital Status:    Housing Stability: Low Risk     Unable to Pay for Housing in the Last Year: No    Number of Places Lived in the Last Year: 2    Unstable Housing in the Last Year: No     Family History   Problem Relation Age of Onset    Diabetes Mother     Heart disease Mother     Dementia Mother     Heart attack Father     Cancer Father         rectal    Hypertension Father     Heart disease Father     Diabetes Father     Kidney disease Brother         Allergies  Review of patient's allergies indicates:  No Known Allergies    Review of Systems   Review of Systems   Constitutional: Negative for fever.   HENT:  Negative for nosebleeds.    Eyes:  Negative for visual disturbance.   Cardiovascular:  Positive for chest pain and dyspnea on exertion. Negative for claudication, palpitations and syncope.   Respiratory:  Negative for cough, hemoptysis and wheezing.    Endocrine: Negative for cold intolerance, heat intolerance, polyphagia and polyuria.   Hematologic/Lymphatic: Negative for bleeding problem.   Skin:  Negative for rash.   Musculoskeletal:  Negative for myalgias.   Gastrointestinal:  Negative for hematemesis, hematochezia, nausea and vomiting.   Genitourinary:  Negative for dysuria.   Neurological:  Negative for focal weakness and sensory change.   Psychiatric/Behavioral:  Negative for altered mental status.      Physical Exam  Vitals:    06/29/23 0836   BP: 136/84   Pulse: 62     Wt Readings from Last 1 Encounters:   06/29/23 101.4 kg (223 lb 8.7 oz)     Physical Exam  Constitutional:       General: She is not in acute distress.  HENT:      Head: Normocephalic and atraumatic.      Mouth/Throat:      Mouth: Mucous membranes are moist.   Eyes:      Extraocular Movements: Extraocular movements intact.      Pupils: Pupils are equal, round, and reactive to light.   Neck:      Vascular: No carotid bruit or JVD.   Cardiovascular:      Rate and Rhythm: Normal rate and regular rhythm.      Heart sounds: No  murmur heard.    No friction rub. No gallop.   Pulmonary:      Effort: Pulmonary effort is normal.      Breath sounds: Normal breath sounds.   Abdominal:      Tenderness: There is no abdominal tenderness. There is no guarding or rebound.   Musculoskeletal:      Right lower leg: Edema present.      Left lower leg: Edema present.   Skin:     General: Skin is warm and dry.      Capillary Refill: Capillary refill takes less than 2 seconds.   Neurological:      General: No focal deficit present.      Mental Status: She is alert.   Psychiatric:         Mood and Affect: Mood normal.       Labs  Hospital Outpatient Visit on 06/19/2023   Component Date Value Ref Range Status    85% Max Predicted HR 06/19/2023 129   Final    Max Predicted HR 06/19/2023 151   Final    [facility] CV CPX PATIENT IS MALE 06/19/2023 0.0   Final    [facility] CV CPX PATIENT IS FEMALE 06/19/2023 1.0   Final    Systolic blood pressure 06/19/2023 145  mmHg Final    Diastolic blood pressure 06/19/2023 69  mmHg Final    HR at rest 06/19/2023 44  bpm Final    Peak Systolic BP 06/19/2023 145  mmHg Final    Peak Diatolic BP 06/19/2023 69  mmHg Final    Peak HR 06/19/2023 87  bpm Final    % Max HR Achieved 06/19/2023 57   Final    RPP 06/19/2023 6,380   Final    Peak RPP 06/19/2023 12,615   Final    dose 06/19/2023 0.4  mg Final   Lab Visit on 05/01/2023   Component Date Value Ref Range Status    WBC 05/01/2023 7.04  3.90 - 12.70 K/uL Final    RBC 05/01/2023 4.25  4.00 - 5.40 M/uL Final    Hemoglobin 05/01/2023 12.6  12.0 - 16.0 g/dL Final    Hematocrit 05/01/2023 38.9  37.0 - 48.5 % Final    MCV 05/01/2023 92  82 - 98 fL Final    MCH 05/01/2023 29.6  27.0 - 31.0 pg Final    MCHC 05/01/2023 32.4  32.0 - 36.0 g/dL Final    RDW 05/01/2023 12.4  11.5 - 14.5 % Final    Platelets 05/01/2023 244  150 - 450 K/uL Final    MPV 05/01/2023 9.8  9.2 - 12.9 fL Final    Immature Granulocytes 05/01/2023 0.1  0.0 - 0.5 % Final    Gran # (ANC) 05/01/2023 4.1  1.8 - 7.7 K/uL Final     Immature Grans (Abs) 05/01/2023 0.01  0.00 - 0.04 K/uL Final    Comment: Mild elevation in immature granulocytes is non specific and   can be seen in a variety of conditions including stress response,   acute inflammation, trauma and pregnancy. Correlation with other   laboratory and clinical findings is essential.      Lymph # 05/01/2023 2.1  1.0 - 4.8 K/uL Final    Mono # 05/01/2023 0.5  0.3 - 1.0 K/uL Final    Eos # 05/01/2023 0.2  0.0 - 0.5 K/uL Final    Baso # 05/01/2023 0.05  0.00 - 0.20 K/uL Final    nRBC 05/01/2023 0  0 /100 WBC Final    Gran % 05/01/2023 58.5  38.0 - 73.0 % Final    Lymph % 05/01/2023 30.0  18.0 - 48.0 % Final    Mono % 05/01/2023 7.7  4.0 - 15.0 % Final    Eosinophil % 05/01/2023 3.0  0.0 - 8.0 % Final    Basophil % 05/01/2023 0.7  0.0 - 1.9 % Final    Differential Method 05/01/2023 Automated   Final    Sodium 05/01/2023 141  136 - 145 mmol/L Final    Potassium 05/01/2023 3.9  3.5 - 5.1 mmol/L Final    Chloride 05/01/2023 106  95 - 110 mmol/L Final    CO2 05/01/2023 27  23 - 29 mmol/L Final    Glucose 05/01/2023 106  70 - 110 mg/dL Final    BUN 05/01/2023 15  8 - 23 mg/dL Final    Creatinine 05/01/2023 1.0  0.5 - 1.4 mg/dL Final    Calcium 05/01/2023 8.8  8.7 - 10.5 mg/dL Final    Total Protein 05/01/2023 7.0  6.0 - 8.4 g/dL Final    Albumin 05/01/2023 3.5  3.5 - 5.2 g/dL Final    Total Bilirubin 05/01/2023 0.5  0.1 - 1.0 mg/dL Final    Comment: For infants and newborns, interpretation of results should be based  on gestational age, weight and in agreement with clinical  observations.    Premature Infant recommended reference ranges:  Up to 24 hours.............<8.0 mg/dL  Up to 48 hours............<12.0 mg/dL  3-5 days..................<15.0 mg/dL  6-29 days.................<15.0 mg/dL      Alkaline Phosphatase 05/01/2023 94  55 - 135 U/L Final    AST 05/01/2023 15  10 - 40 U/L Final    ALT 05/01/2023 9 (L)  10 - 44 U/L Final    Anion Gap 05/01/2023 8  8 - 16 mmol/L Final    eGFR  05/01/2023 >60.0  >60 mL/min/1.73 m^2 Final    Cholesterol 05/01/2023 126  120 - 199 mg/dL Final    Comment: The National Cholesterol Education Program (NCEP) has set the  following guidelines (reference ranges) for Cholesterol:  Optimal.....................<200 mg/dL  Borderline High.............200-239 mg/dL  High........................> or = 240 mg/dL      Triglycerides 05/01/2023 101  30 - 150 mg/dL Final    Comment: The National Cholesterol Education Program (NCEP) has set the  following guidelines (reference values) for triglycerides:  Normal......................<150 mg/dL  Borderline High.............150-199 mg/dL  High........................200-499 mg/dL      HDL 05/01/2023 54  40 - 75 mg/dL Final    Comment: The National Cholesterol Education Program (NCEP) has set the  following guidelines (reference values) for HDL Cholesterol:  Low...............<40 mg/dL  Optimal...........>60 mg/dL      LDL Cholesterol 05/01/2023 51.8 (L)  63.0 - 159.0 mg/dL Final    Comment: The National Cholesterol Education Program (NCEP) has set the  following guidelines (reference values) for LDL Cholesterol:  Optimal.......................<130 mg/dL  Borderline High...............130-159 mg/dL  High..........................160-189 mg/dL  Very High.....................>190 mg/dL      HDL/Cholesterol Ratio 05/01/2023 42.9  20.0 - 50.0 % Final    Total Cholesterol/HDL Ratio 05/01/2023 2.3  2.0 - 5.0 Final    Non-HDL Cholesterol 05/01/2023 72  mg/dL Final    Comment: Risk category and Non-HDL cholesterol goals:  Coronary heart disease (CHD)or equivalent (10-year risk of CHD >20%):  Non-HDL cholesterol goal     <130 mg/dL  Two or more CHD risk factors and 10-year risk of CHD <= 20%:  Non-HDL cholesterol goal     <160 mg/dL  0 to 1 CHD risk factor:  Non-HDL cholesterol goal     <190 mg/dL      TSH 05/01/2023 1.582  0.400 - 4.000 uIU/mL Final    Vit D, 25-Hydroxy 05/01/2023 25 (L)  30 - 96 ng/mL Final    Comment: Vitamin D  deficiency.........<10 ng/mL                              Vitamin D insufficiency......10-29 ng/mL       Vitamin D sufficiency........> or equal to 30 ng/mL  Vitamin D toxicity............>100 ng/mL      Hemoglobin A1C 05/01/2023 5.1  4.0 - 5.6 % Final    Comment: ADA Screening Guidelines:  5.7-6.4%  Consistent with prediabetes  >or=6.5%  Consistent with diabetes    High levels of fetal hemoglobin interfere with the HbA1C  assay. Heterozygous hemoglobin variants (HbS, HgC, etc)do  not significantly interfere with this assay.   However, presence of multiple variants may affect accuracy.      Estimated Avg Glucose 05/01/2023 100  68 - 131 mg/dL Final       EKG  EKG 05/2023 sinus bradycardia left bundle-branch block    Echo   No results found for this or any previous visit.    Imaging  Nuclear Stress Test    Result Date: 6/19/2023    The ECG portion of the study is negative for ischemia although technically uninterpretable due to LBBB on resting ECG.   The patient reported no chest pain during the stress test.   There were no arrhythmias during stress.   The nuclear portion of this study will be reported separately.     NM Myocardial Perfusion Spect Multi Pharmacologic    Result Date: 6/19/2023  EXAMINATION: NM MYOCARDIAL PERFUSION SPECT MULTI PHARM CLINICAL HISTORY: Chest pain/anginal equiv, intermediate CAD risk, not treadmill candidate;  Bradycardia, unspecified TECHNIQUE: SPECT-CT images through the heart were acquired at rest following the intravenous administration of 10.1 mCi Tc-99m tetrofosmin. SPECT-CT images were then acquired during a Lexiscan cardiac stress following the intravenous administration of 33 mCi Tc-99m tetrofosmin. The clinical stress and ECG portion of the study will be interpreted separately. COMPARISON: None available FINDINGS: Focal hypokinesis in the anterior wall near the apex.  This corresponds to a small anterior wall perfusion defect near the apex which is slightly larger/more  conspicuous on stress images.  Finding is present on both the attenuation corrected and non corrected images. Stress and rest end diastolic volumes 60 and 43 mL, respectively. Stress and rest end systolic volumes 23 and 19 mL, respectively. Estimated left ventricular ejection fraction 61% during stress and 56% during rest. TID: 1.06 Limited noncontrast CT demonstrates a small hiatal hernia and postop changes of prior gastric sleeve.     1. Suspect a small area reversible ischemia in the anterior wall near the apex with associated focal hypokinesis. 2. A small underlying fixed defect may indicate underlying infarct versus apical thinning. 3. Left ventricular ejection fraction 61% during stress and 56% during rest. This report was flagged in Epic as abnormal. Electronically signed by: Keegan Phoenix Date:    06/19/2023 Time:    16:44      Prior coronary angiogram / intervention:  N/A    Assessment and Plan  1. Abnormal nuclear stress test  Echocardiogram and coronary angiogram  - Echo; Future    2. Abdominal aortic atherosclerosis  The patient will qualify for statin defer to primary cardiologist    3. Dyspnea on exertion / CP  As per 1.  - Echo; Future    4. LBBB (left bundle branch block)  Noted  - Echo; Future    5. Mixed hyperlipidemia  Defer treatment to primary cardiologist    6. Sinus bradycardia  Holter pending      Follow Up  Follow up in about 1 month (around 7/29/2023).      Tarik gN MD, FACC, VI  Interventional Cardiology

## 2023-07-03 ENCOUNTER — TELEPHONE (OUTPATIENT)
Dept: CARDIOLOGY | Facility: CLINIC | Age: 63
End: 2023-07-03
Payer: COMMERCIAL

## 2023-07-03 NOTE — TELEPHONE ENCOUNTER
----- Message from Katie Camacho sent at 7/3/2023 12:38 PM CDT -----  Regarding: reschedule appt  Contact: Destiny Soliman calling to reschedule appointment for procedure on 07/18/2023 to either 07/19,24,27,28/2023  or next week on 07/12,13/14, / 2023. Please advise Requesting a call back.        [phone] (home)

## 2023-07-03 NOTE — TELEPHONE ENCOUNTER
----- Message from Ambrose Yousif sent at 7/3/2023  2:49 PM CDT -----  Contact: pt  Pt requesting call back RE: calling to r/s 7/18 procedure. Please call    Confirmed contact below:  Contact Name:Destiny Carson  Phone Number: [phone]

## 2023-07-03 NOTE — TELEPHONE ENCOUNTER
Spoke with patient, informed none of the dates provided are good dates with the cath lab and provider.  Informed provider is available for procedures on Tuesday mornings only.  Patient will discuss with daughter next availability and call the office back.

## 2023-07-15 ENCOUNTER — TELEPHONE (OUTPATIENT)
Dept: CARDIOLOGY | Facility: CLINIC | Age: 63
End: 2023-07-15
Payer: COMMERCIAL

## 2023-07-15 DIAGNOSIS — R94.39 ABNORMAL CARDIOVASCULAR STRESS TEST: Primary | ICD-10-CM

## 2023-07-15 RX ORDER — ASPIRIN 81 MG/1
81 TABLET ORAL DAILY
Qty: 90 TABLET | Refills: 0 | Status: SHIPPED | OUTPATIENT
Start: 2023-07-15 | End: 2023-09-20

## 2023-07-15 RX ORDER — PRAVASTATIN SODIUM 10 MG/1
10 TABLET ORAL DAILY
Qty: 90 TABLET | Refills: 0 | Status: SHIPPED | OUTPATIENT
Start: 2023-07-15 | End: 2023-08-03 | Stop reason: SDUPTHER

## 2023-07-21 ENCOUNTER — TELEPHONE (OUTPATIENT)
Dept: CARDIOLOGY | Facility: CLINIC | Age: 63
End: 2023-07-21
Payer: COMMERCIAL

## 2023-07-21 NOTE — TELEPHONE ENCOUNTER
----- Message from Malathi Malone sent at 7/21/2023  4:05 PM CDT -----  Regarding: Appt  Contact: Pt [phone]  Pt is calling stating the appt that she has schedule for 7/28, pt wants to know is the appt necessary because she didn't have the other test done that is schedule for 8/15 please call

## 2023-07-21 NOTE — TELEPHONE ENCOUNTER
Spoke with patient, appointment was scheduled in anticipation that angiogram would be scheduled this month.  Rescheduled appointment 2 weeks after angiogram.  Patient verbalized understanding.

## 2023-08-17 ENCOUNTER — TELEPHONE (OUTPATIENT)
Dept: CARDIOLOGY | Facility: CLINIC | Age: 63
End: 2023-08-17
Payer: COMMERCIAL

## 2023-08-17 NOTE — TELEPHONE ENCOUNTER
"Spoke with patient, had angiogram on Tuesday.  She has had a constant headache at the top and base of neck, she has been seeing "specks of light" in peripheral vision, diarrhea yesterday, and mild dizziness - "off balance".  She has been taking 2 tylenol consistently.    Spoke with provider, he advised patient to go to ER for CT of head.  Advised patient of provider recommendation.  Patient verbalized understanding, states if headache comes back she will go to ER.    "

## 2023-08-17 NOTE — TELEPHONE ENCOUNTER
----- Message from Nalini Ramirez sent at 8/17/2023  8:33 AM CDT -----  Regarding: postop issues  The patient called requesting to speak to Nurse states that after procedure on Monday patient has a headache continuing, loosing peripheral vision at times, mild dizziness and diarrhea  Please call to advise at your earliest opportunity    No further information provided      Patient can be contacted @# [phone] (home)

## 2023-08-29 ENCOUNTER — OFFICE VISIT (OUTPATIENT)
Dept: CARDIOLOGY | Facility: CLINIC | Age: 63
End: 2023-08-29
Payer: COMMERCIAL

## 2023-08-29 VITALS
BODY MASS INDEX: 37.73 KG/M2 | HEIGHT: 65 IN | DIASTOLIC BLOOD PRESSURE: 60 MMHG | OXYGEN SATURATION: 98 % | WEIGHT: 226.44 LBS | SYSTOLIC BLOOD PRESSURE: 136 MMHG | HEART RATE: 53 BPM

## 2023-08-29 DIAGNOSIS — E78.00 ELEVATED LDL CHOLESTEROL LEVEL: Chronic | ICD-10-CM

## 2023-08-29 DIAGNOSIS — E78.5 HYPERLIPIDEMIA, UNSPECIFIED HYPERLIPIDEMIA TYPE: ICD-10-CM

## 2023-08-29 DIAGNOSIS — E78.2 MIXED HYPERLIPIDEMIA: Primary | ICD-10-CM

## 2023-08-29 DIAGNOSIS — R06.09 DYSPNEA ON EXERTION: ICD-10-CM

## 2023-08-29 DIAGNOSIS — I25.10 CORONARY ARTERY DISEASE INVOLVING NATIVE CORONARY ARTERY OF NATIVE HEART WITHOUT ANGINA PECTORIS: ICD-10-CM

## 2023-08-29 DIAGNOSIS — I44.7 LBBB (LEFT BUNDLE BRANCH BLOCK): ICD-10-CM

## 2023-08-29 DIAGNOSIS — I27.20 PULMONARY HTN: ICD-10-CM

## 2023-08-29 PROCEDURE — 99214 OFFICE O/P EST MOD 30 MIN: CPT | Mod: S$GLB,,, | Performed by: INTERNAL MEDICINE

## 2023-08-29 PROCEDURE — 1160F PR REVIEW ALL MEDS BY PRESCRIBER/CLIN PHARMACIST DOCUMENTED: ICD-10-PCS | Mod: CPTII,S$GLB,, | Performed by: INTERNAL MEDICINE

## 2023-08-29 PROCEDURE — 3008F PR BODY MASS INDEX (BMI) DOCUMENTED: ICD-10-PCS | Mod: CPTII,S$GLB,, | Performed by: INTERNAL MEDICINE

## 2023-08-29 PROCEDURE — 1159F PR MEDICATION LIST DOCUMENTED IN MEDICAL RECORD: ICD-10-PCS | Mod: CPTII,S$GLB,, | Performed by: INTERNAL MEDICINE

## 2023-08-29 PROCEDURE — 1160F RVW MEDS BY RX/DR IN RCRD: CPT | Mod: CPTII,S$GLB,, | Performed by: INTERNAL MEDICINE

## 2023-08-29 PROCEDURE — 3078F DIAST BP <80 MM HG: CPT | Mod: CPTII,S$GLB,, | Performed by: INTERNAL MEDICINE

## 2023-08-29 PROCEDURE — 3075F PR MOST RECENT SYSTOLIC BLOOD PRESS GE 130-139MM HG: ICD-10-PCS | Mod: CPTII,S$GLB,, | Performed by: INTERNAL MEDICINE

## 2023-08-29 PROCEDURE — 3078F PR MOST RECENT DIASTOLIC BLOOD PRESSURE < 80 MM HG: ICD-10-PCS | Mod: CPTII,S$GLB,, | Performed by: INTERNAL MEDICINE

## 2023-08-29 PROCEDURE — 3075F SYST BP GE 130 - 139MM HG: CPT | Mod: CPTII,S$GLB,, | Performed by: INTERNAL MEDICINE

## 2023-08-29 PROCEDURE — 99999 PR PBB SHADOW E&M-EST. PATIENT-LVL IV: ICD-10-PCS | Mod: PBBFAC,,, | Performed by: INTERNAL MEDICINE

## 2023-08-29 PROCEDURE — 3008F BODY MASS INDEX DOCD: CPT | Mod: CPTII,S$GLB,, | Performed by: INTERNAL MEDICINE

## 2023-08-29 PROCEDURE — 99999 PR PBB SHADOW E&M-EST. PATIENT-LVL IV: CPT | Mod: PBBFAC,,, | Performed by: INTERNAL MEDICINE

## 2023-08-29 PROCEDURE — 3044F HG A1C LEVEL LT 7.0%: CPT | Mod: CPTII,S$GLB,, | Performed by: INTERNAL MEDICINE

## 2023-08-29 PROCEDURE — 1159F MED LIST DOCD IN RCRD: CPT | Mod: CPTII,S$GLB,, | Performed by: INTERNAL MEDICINE

## 2023-08-29 PROCEDURE — 3044F PR MOST RECENT HEMOGLOBIN A1C LEVEL <7.0%: ICD-10-PCS | Mod: CPTII,S$GLB,, | Performed by: INTERNAL MEDICINE

## 2023-08-29 PROCEDURE — 99214 PR OFFICE/OUTPT VISIT, EST, LEVL IV, 30-39 MIN: ICD-10-PCS | Mod: S$GLB,,, | Performed by: INTERNAL MEDICINE

## 2023-08-29 NOTE — PROGRESS NOTES
[facility]  Cardiology Clinic Note      Chief Complaint  Chief Complaint   Patient presents with    Follow-up       HPI:      62-year-old female with past medical history status post gastric sleeve, syncope, hypothyroidism, left bundle branch block, prior tobacco (significant history), hyperlipidemia, hypertension, MPI 06/2023 reversible apical defect normal EF coronary angiogram 08/2023 mild nonobstructive coronary artery disease, echo 07/2023 EF estimated 45-50% diastolic function indeterminate mild mitral valve regurgitation normal RV mild tricuspid regurgitation PASP 40 CVP 8    EF normal and MPI and thought to be mildly decreased low normal on echo  Seen previously for dyspnea and chest discomfort in addition to abnormal MPI taken for coronary angiogram no obstructive disease  Presents for post angiogram visit    Medications  Current Outpatient Medications   Medication Sig Dispense Refill    aspirin (ECOTRIN) 81 MG EC tablet Take 1 tablet (81 mg total) by mouth once daily. 90 tablet 0    ergocalciferol (ERGOCALCIFEROL) 50,000 unit Cap Take 1 capsule (50,000 Units total) by mouth every 7 days. (Patient taking differently: Take 50,000 Units by mouth every 7 days. Vitamin D) 12 capsule 0    EScitalopram oxalate (LEXAPRO) 20 MG tablet Take 1 tablet (20 mg total) by mouth once daily. 90 tablet 0    levothyroxine (SYNTHROID) 25 MCG tablet TAKE 1 TABLET BY MOUTH BEFORE BREAKFAST. 30 tablet 2    pravastatin (PRAVACHOL) 10 MG tablet Take 1 tablet (10 mg total) by mouth once daily. 90 tablet 0     No current facility-administered medications for this visit.     Facility-Administered Medications Ordered in Other Visits   Medication Dose Route Frequency Provider Last Rate Last Admin    lactated ringers infusion   Intravenous Continuous Anders Wilson MD        sodium chloride 0.9% flush 10 mL  10 mL Intravenous PRN Anders Wilson MD            History  Past Medical History:   Diagnosis Date     Hyperlipidemia     Syncope and collapse     Thyroid disease      Past Surgical History:   Procedure Laterality Date    ANGIOGRAM, CORONARY ARTERY  08/15/2023     SECTION      COLONOSCOPY  2022    COLONOSCOPY N/A 2022    Procedure: COLONOSCOPY;  Surgeon: Anders Wilson MD;  Location: [facility] ENDO;  Service: Endoscopy;  Laterality: N/A;    CORONARY ANGIOGRAPHY Right 8/15/2023    Procedure: ANGIOGRAM, CORONARY ARTERY;  Surgeon: Tarik Ng MD;  Location: [facility] CATH LAB;  Service: Cardiology;  Laterality: Right;    ESOPHAGOGASTRODUODENOSCOPY      ESOPHAGOGASTRODUODENOSCOPY N/A 2022    Procedure: EGD (ESOPHAGOGASTRODUODENOSCOPY);  Surgeon: Anders Wilson MD;  Location: [facility] ENDO;  Service: Endoscopy;  Laterality: N/A;    FOOT SURGERY Left     screws pins in foot.    gastric sleeve       Social History     Socioeconomic History    Marital status: Single   Occupational History    Occupation: data input for [employer]     Employer: [employer]   Tobacco Use    Smoking status: Former     Current packs/day: 0.00     Average packs/day: 0.3 packs/day for 40.0 years (10.0 ttl pk-yrs)     Types: Cigarettes     Start date: 3/16/1976     Quit date: 3/16/2016     Years since quittin.4    Smokeless tobacco: Former     Quit date: 3/16/2016   Substance and Sexual Activity    Alcohol use: Yes     Alcohol/week: 1.0 standard drink of alcohol     Types: 1 Glasses of wine per week     Comment: occasionally    Drug use: No    Sexual activity: Never     Partners: Male     Birth control/protection: None     Social Determinants of Health     Financial Resource Strain: Medium Risk (2023)    Overall Financial Resource Strain (CARDIA)     Difficulty of Paying Living Expenses: Somewhat hard   Food Insecurity: No Food Insecurity (2023)    Hunger Vital Sign     Worried About Running Out of Food in the Last Year: Never true     Ran Out of Food in the Last Year: Never true   Transportation  Needs: No Transportation Needs (6/22/2023)    PRAPARE - Transportation     Lack of Transportation (Medical): No     Lack of Transportation (Non-Medical): No   Physical Activity: Insufficiently Active (6/22/2023)    Exercise Vital Sign     Days of Exercise per Week: 2 days     Minutes of Exercise per Session: 20 min   Stress: Stress Concern Present (6/22/2023)    [employer] [place] [employer] - Occupational Stress Questionnaire     Feeling of Stress : Very much   Social Connections: Unknown (6/22/2023)    Social Connection and Isolation Panel [NHANES]     Frequency of Communication with Friends and Family: More than three times a week     Frequency of Social Gatherings with Friends and Family: More than three times a week     Active Member of Clubs or Organizations: Yes     Attends Club or Organization Meetings: 1 to 4 times per year     Marital Status:    Housing Stability: Low Risk  (6/22/2023)    Housing Stability Vital Sign     Unable to Pay for Housing in the Last Year: No     Number of Places Lived in the Last Year: 2     Unstable Housing in the Last Year: No     Family History   Problem Relation Age of Onset    Diabetes Mother     Heart disease Mother     Dementia Mother     Heart attack Father     Cancer Father         rectal    Hypertension Father     Heart disease Father     Diabetes Father     Kidney disease Brother         Allergies  Review of patient's allergies indicates:  No Known Allergies    Review of Systems   Review of Systems   Constitutional: Negative for fever.   HENT:  Negative for nosebleeds.    Eyes:  Negative for visual disturbance.   Cardiovascular:  Positive for chest pain and dyspnea on exertion. Negative for claudication, palpitations and syncope.   Respiratory:  Negative for cough, hemoptysis and wheezing.    Endocrine: Negative for cold intolerance, heat intolerance, polyphagia and polyuria.   Hematologic/Lymphatic: Negative for bleeding problem.   Skin:  Negative  for rash.   Musculoskeletal:  Negative for myalgias.   Gastrointestinal:  Negative for hematemesis, hematochezia, nausea and vomiting.   Genitourinary:  Negative for dysuria.   Neurological:  Negative for focal weakness and sensory change.   Psychiatric/Behavioral:  Negative for altered mental status.        Physical Exam  Vitals:    08/29/23 1258   BP: 136/60   Pulse: (!) 53     Wt Readings from Last 1 Encounters:   08/29/23 102.7 kg (226 lb 6.6 oz)     Physical Exam  Constitutional:       General: She is not in acute distress.  HENT:      Head: Normocephalic and atraumatic.      Mouth/Throat:      Mouth: Mucous membranes are moist.   Eyes:      Extraocular Movements: Extraocular movements intact.      Pupils: Pupils are equal, round, and reactive to light.   Neck:      Vascular: No carotid bruit or JVD.   Cardiovascular:      Rate and Rhythm: Normal rate and regular rhythm.      Heart sounds: No murmur heard.     No friction rub. No gallop.   Pulmonary:      Effort: Pulmonary effort is normal.      Breath sounds: Normal breath sounds.   Abdominal:      Tenderness: There is no abdominal tenderness. There is no guarding or rebound.   Musculoskeletal:      Right lower leg: Edema present.      Left lower leg: Edema present.   Skin:     General: Skin is warm and dry.      Capillary Refill: Capillary refill takes less than 2 seconds.   Neurological:      General: No focal deficit present.      Mental Status: She is alert.   Psychiatric:         Mood and Affect: Mood normal.         Labs  Lab Visit on 08/11/2023   Component Date Value Ref Range Status    WBC 08/11/2023 8.66  3.90 - 12.70 K/uL Final    RBC 08/11/2023 4.54  4.00 - 5.40 M/uL Final    Hemoglobin 08/11/2023 13.3  12.0 - 16.0 g/dL Final    Hematocrit 08/11/2023 40.7  37.0 - 48.5 % Final    MCV 08/11/2023 90  82 - 98 fL Final    MCH 08/11/2023 29.3  27.0 - 31.0 pg Final    MCHC 08/11/2023 32.7  32.0 - 36.0 g/dL Final    RDW 08/11/2023 12.7  11.5 - 14.5 % Final     Platelets 08/11/2023 286  150 - 450 K/uL Final    MPV 08/11/2023 10.0  9.2 - 12.9 fL Final    Immature Granulocytes 08/11/2023 0.1  0.0 - 0.5 % Final    Gran # (ANC) 08/11/2023 5.3  1.8 - 7.7 K/uL Final    Immature Grans (Abs) 08/11/2023 0.01  0.00 - 0.04 K/uL Final    Comment: Mild elevation in immature granulocytes is non specific and   can be seen in a variety of conditions including stress response,   acute inflammation, trauma and pregnancy. Correlation with other   laboratory and clinical findings is essential.      Lymph # 08/11/2023 2.4  1.0 - 4.8 K/uL Final    Mono # 08/11/2023 0.6  0.3 - 1.0 K/uL Final    Eos # 08/11/2023 0.3  0.0 - 0.5 K/uL Final    Baso # 08/11/2023 0.04  0.00 - 0.20 K/uL Final    nRBC 08/11/2023 0  0 /100 WBC Final    Gran % 08/11/2023 61.3  38.0 - 73.0 % Final    Lymph % 08/11/2023 27.7  18.0 - 48.0 % Final    Mono % 08/11/2023 6.8  4.0 - 15.0 % Final    Eosinophil % 08/11/2023 3.6  0.0 - 8.0 % Final    Basophil % 08/11/2023 0.5  0.0 - 1.9 % Final    Differential Method 08/11/2023 Automated   Final    Sodium 08/11/2023 141  136 - 145 mmol/L Final    Potassium 08/11/2023 4.0  3.5 - 5.1 mmol/L Final    Chloride 08/11/2023 107  95 - 110 mmol/L Final    CO2 08/11/2023 23  23 - 29 mmol/L Final    Glucose 08/11/2023 84  70 - 110 mg/dL Final    BUN 08/11/2023 18  8 - 23 mg/dL Final    Creatinine 08/11/2023 1.0  0.5 - 1.4 mg/dL Final    Calcium 08/11/2023 9.3  8.7 - 10.5 mg/dL Final    Anion Gap 08/11/2023 11  8 - 16 mmol/L Final    eGFR 08/11/2023 >60.0  >60 mL/min/1.73 m^2 Final    Prothrombin Time 08/11/2023 10.3  9.0 - 12.5 sec Final    INR 08/11/2023 1.0  0.8 - 1.2 Final    Comment: Coumadin Therapy:  2.0 - 3.0 for INR for all indicators except mechanical heart valves  and antiphospholipid syndromes which should use 2.5 - 3.5.  LOT^040^PT Inn^[id number]      Magnesium 08/11/2023 2.0  1.6 - 2.6 mg/dL Final   Hospital Outpatient Visit on 07/12/2023   Component Date Value Ref Range Status     BSA 07/12/2023 2.15  m2 Final    TDI SEPTAL 07/12/2023 0.08  m/s Final    LV LATERAL E/E' RATIO 07/12/2023 8.90  m/s Final    LV SEPTAL E/E' RATIO 07/12/2023 11.13  m/s Final    IVC diameter 07/12/2023 1.88  cm Final    Left Ventricular Outflow Tract Beba* 07/12/2023 0.64  cm/s Final    Left Ventricular Outflow Tract Beba* 07/12/2023 1.96  mmHg Final    AORTIC VALVE CUSP SEPERATION 07/12/2023 1.75  cm Final    TDI LATERAL 07/12/2023 0.10  m/s Final    PV PEAK VELOCITY 07/12/2023 1.41  cm/s Final    LVIDd 07/12/2023 4.50  3.5 - 6.0 cm Final    IVS 07/12/2023 1.10  0.6 - 1.1 cm Final    Posterior Wall 07/12/2023 1.10 (A)  0.6 - 1.1 cm Final    Ao root annulus 07/12/2023 2.14  cm Final    LVIDs 07/12/2023 3.47  2.1 - 4.0 cm Final    FS 07/12/2023 23  28 - 44 % Final    Sinus 07/12/2023 2.31  cm Final    STJ 07/12/2023 2.33  cm Final    Ascending aorta 07/12/2023 2.24  cm Final    LV mass 07/12/2023 175.02  g Final    LA size 07/12/2023 3.38  cm Final    RVDD 07/12/2023 2.13  cm Final    Left Ventricle Relative Wall Thick* 07/12/2023 0.49  cm Final    AV mean gradient 07/12/2023 5  mmHg Final    AV valve area 07/12/2023 1.54  cm2 Final    AV Velocity Ratio 07/12/2023 0.63   Final    AV index (prosthetic) 07/12/2023 0.68   Final    MV mean gradient 07/12/2023 2  mmHg Final    MV valve area p 1/2 method 07/12/2023 2.61  cm2 Final    MV valve area by continuity eq 07/12/2023 1.35  cm2 Final    E/A ratio 07/12/2023 0.99   Final    Mean e' 07/12/2023 0.09  m/s Final    E wave deceleration time 07/12/2023 247.41  msec Final    LVOT diameter 07/12/2023 1.70  cm Final    LVOT area 07/12/2023 2.3  cm2 Final    LVOT peak rick 07/12/2023 0.95  m/s Final    LVOT peak VTI 07/12/2023 25.30  cm Final    Ao peak rick 07/12/2023 1.51  m/s Final    Ao VTI 07/12/2023 37.3  cm Final    Mr max rick 07/12/2023 5.26  m/s Final    LVOT stroke volume 07/12/2023 57.40  cm3 Final    AV peak gradient 07/12/2023 9  mmHg Final    MV peak gradient  07/12/2023 5  mmHg Final    E/E' ratio 07/12/2023 9.89  m/s Final    MV Peak E Noel 07/12/2023 0.89  m/s Final    TR Max Noel 07/12/2023 2.83  m/s Final    MV VTI 07/12/2023 42.6  cm Final    MV stenosis pressure 1/2 time 07/12/2023 84.29  ms Final    MV Peak A Noel 07/12/2023 0.90  m/s Final    LV Systolic Volume 07/12/2023 49.90  mL Final    LV Systolic Volume Index 07/12/2023 24.1  mL/m2 Final    LV Diastolic Volume 07/12/2023 118.87  mL Final    LV Diastolic Volume Index 07/12/2023 57.43  mL/m2 Final    LV Mass Index 07/12/2023 85  g/m2 Final    RA Major Rifle 07/12/2023 4.19  cm Final    Left Atrium Minor Axis 07/12/2023 3.22  cm Final    Left Atrium Major Axis 07/12/2023 5.11  cm Final    Triscuspid Valve Regurgitation Pea* 07/12/2023 32  mmHg Final    LA Volume Index (Mod) 07/12/2023 30.0  mL/m2 Final    LA volume (mod) 07/12/2023 62.00  cm3 Final    RA Width 07/12/2023 2.89  cm Final    Right Atrial Pressure (from IVC) 07/12/2023 8  mmHg Final    EF 07/12/2023 50  % Final    TV resting pulmonary artery pressu* 07/12/2023 40  mmHg Final   Hospital Outpatient Visit on 06/19/2023   Component Date Value Ref Range Status    85% Max Predicted HR 06/19/2023 129   Final    Max Predicted HR 06/19/2023 151   Final    [facility]HS CV CPX PATIENT IS MALE 06/19/2023 0.0   Final    [facility] CV CPX PATIENT IS FEMALE 06/19/2023 1.0   Final    Systolic blood pressure 06/19/2023 145  mmHg Final    Diastolic blood pressure 06/19/2023 69  mmHg Final    HR at rest 06/19/2023 44  bpm Final    Peak Systolic BP 06/19/2023 145  mmHg Final    Peak Diatolic BP 06/19/2023 69  mmHg Final    Peak HR 06/19/2023 87  bpm Final    % Max HR Achieved 06/19/2023 57   Final    RPP 06/19/2023 6,380   Final    Peak RPP 06/19/2023 12,615   Final    dose 06/19/2023 0.4  mg Final   Lab Visit on 05/01/2023   Component Date Value Ref Range Status    WBC 05/01/2023 7.04  3.90 - 12.70 K/uL Final    RBC 05/01/2023 4.25  4.00 - 5.40 M/uL Final    Hemoglobin 05/01/2023 12.6   12.0 - 16.0 g/dL Final    Hematocrit 05/01/2023 38.9  37.0 - 48.5 % Final    MCV 05/01/2023 92  82 - 98 fL Final    MCH 05/01/2023 29.6  27.0 - 31.0 pg Final    MCHC 05/01/2023 32.4  32.0 - 36.0 g/dL Final    RDW 05/01/2023 12.4  11.5 - 14.5 % Final    Platelets 05/01/2023 244  150 - 450 K/uL Final    MPV 05/01/2023 9.8  9.2 - 12.9 fL Final    Immature Granulocytes 05/01/2023 0.1  0.0 - 0.5 % Final    Gran # (ANC) 05/01/2023 4.1  1.8 - 7.7 K/uL Final    Immature Grans (Abs) 05/01/2023 0.01  0.00 - 0.04 K/uL Final    Comment: Mild elevation in immature granulocytes is non specific and   can be seen in a variety of conditions including stress response,   acute inflammation, trauma and pregnancy. Correlation with other   laboratory and clinical findings is essential.      Lymph # 05/01/2023 2.1  1.0 - 4.8 K/uL Final    Mono # 05/01/2023 0.5  0.3 - 1.0 K/uL Final    Eos # 05/01/2023 0.2  0.0 - 0.5 K/uL Final    Baso # 05/01/2023 0.05  0.00 - 0.20 K/uL Final    nRBC 05/01/2023 0  0 /100 WBC Final    Gran % 05/01/2023 58.5  38.0 - 73.0 % Final    Lymph % 05/01/2023 30.0  18.0 - 48.0 % Final    Mono % 05/01/2023 7.7  4.0 - 15.0 % Final    Eosinophil % 05/01/2023 3.0  0.0 - 8.0 % Final    Basophil % 05/01/2023 0.7  0.0 - 1.9 % Final    Differential Method 05/01/2023 Automated   Final    Sodium 05/01/2023 141  136 - 145 mmol/L Final    Potassium 05/01/2023 3.9  3.5 - 5.1 mmol/L Final    Chloride 05/01/2023 106  95 - 110 mmol/L Final    CO2 05/01/2023 27  23 - 29 mmol/L Final    Glucose 05/01/2023 106  70 - 110 mg/dL Final    BUN 05/01/2023 15  8 - 23 mg/dL Final    Creatinine 05/01/2023 1.0  0.5 - 1.4 mg/dL Final    Calcium 05/01/2023 8.8  8.7 - 10.5 mg/dL Final    Total Protein 05/01/2023 7.0  6.0 - 8.4 g/dL Final    Albumin 05/01/2023 3.5  3.5 - 5.2 g/dL Final    Total Bilirubin 05/01/2023 0.5  0.1 - 1.0 mg/dL Final    Comment: For infants and newborns, interpretation of results should be based  on gestational age, weight  and in agreement with clinical  observations.    Premature Infant recommended reference ranges:  Up to 24 hours.............<8.0 mg/dL  Up to 48 hours............<12.0 mg/dL  3-5 days..................<15.0 mg/dL  6-29 days.................<15.0 mg/dL      Alkaline Phosphatase 05/01/2023 94  55 - 135 U/L Final    AST 05/01/2023 15  10 - 40 U/L Final    ALT 05/01/2023 9 (L)  10 - 44 U/L Final    Anion Gap 05/01/2023 8  8 - 16 mmol/L Final    eGFR 05/01/2023 >60.0  >60 mL/min/1.73 m^2 Final    Cholesterol 05/01/2023 126  120 - 199 mg/dL Final    Comment: The National Cholesterol Education Program (NCEP) has set the  following guidelines (reference ranges) for Cholesterol:  Optimal.....................<200 mg/dL  Borderline High.............200-239 mg/dL  High........................> or = 240 mg/dL      Triglycerides 05/01/2023 101  30 - 150 mg/dL Final    Comment: The National Cholesterol Education Program (NCEP) has set the  following guidelines (reference values) for triglycerides:  Normal......................<150 mg/dL  Borderline High.............150-199 mg/dL  High........................200-499 mg/dL      HDL 05/01/2023 54  40 - 75 mg/dL Final    Comment: The National Cholesterol Education Program (NCEP) has set the  following guidelines (reference values) for HDL Cholesterol:  Low...............<40 mg/dL  Optimal...........>60 mg/dL      LDL Cholesterol 05/01/2023 51.8 (L)  63.0 - 159.0 mg/dL Final    Comment: The National Cholesterol Education Program (NCEP) has set the  following guidelines (reference values) for LDL Cholesterol:  Optimal.......................<130 mg/dL  Borderline High...............130-159 mg/dL  High..........................160-189 mg/dL  Very High.....................>190 mg/dL      HDL/Cholesterol Ratio 05/01/2023 42.9  20.0 - 50.0 % Final    Total Cholesterol/HDL Ratio 05/01/2023 2.3  2.0 - 5.0 Final    Non-HDL Cholesterol 05/01/2023 72  mg/dL Final    Comment: Risk category and  Non-HDL cholesterol goals:  Coronary heart disease (CHD)or equivalent (10-year risk of CHD >20%):  Non-HDL cholesterol goal     <130 mg/dL  Two or more CHD risk factors and 10-year risk of CHD <= 20%:  Non-HDL cholesterol goal     <160 mg/dL  0 to 1 CHD risk factor:  Non-HDL cholesterol goal     <190 mg/dL      TSH 05/01/2023 1.582  0.400 - 4.000 uIU/mL Final    Vit D, 25-Hydroxy 05/01/2023 25 (L)  30 - 96 ng/mL Final    Comment: Vitamin D deficiency.........<10 ng/mL                              Vitamin D insufficiency......10-29 ng/mL       Vitamin D sufficiency........> or equal to 30 ng/mL  Vitamin D toxicity............>100 ng/mL      Hemoglobin A1C 05/01/2023 5.1  4.0 - 5.6 % Final    Comment: ADA Screening Guidelines:  5.7-6.4%  Consistent with prediabetes  >or=6.5%  Consistent with diabetes    High levels of fetal hemoglobin interfere with the HbA1C  assay. Heterozygous hemoglobin variants (HbS, HgC, etc)do  not significantly interfere with this assay.   However, presence of multiple variants may affect accuracy.      Estimated Avg Glucose 05/01/2023 100  68 - 131 mg/dL Final       EKG  EKG 05/2023 sinus bradycardia left bundle-branch block    Echo   Results for orders placed or performed during the hospital encounter of 07/12/23   Echo   Result Value Ref Range    BSA 2.15 m2    TDI SEPTAL 0.08 m/s    LV LATERAL E/E' RATIO 8.90 m/s    LV SEPTAL E/E' RATIO 11.13 m/s    IVC diameter 1.88 cm    Left Ventricular Outflow Tract Mean Velocity 0.64 cm/s    Left Ventricular Outflow Tract Mean Gradient 1.96 mmHg    AORTIC VALVE CUSP SEPERATION 1.75 cm    TDI LATERAL 0.10 m/s    PV PEAK VELOCITY 1.41 cm/s    LVIDd 4.50 3.5 - 6.0 cm    IVS 1.10 0.6 - 1.1 cm    Posterior Wall 1.10 (A) 0.6 - 1.1 cm    Ao root annulus 2.14 cm    LVIDs 3.47 2.1 - 4.0 cm    FS 23 28 - 44 %    Sinus 2.31 cm    STJ 2.33 cm    Ascending aorta 2.24 cm    LV mass 175.02 g    LA size 3.38 cm    RVDD 2.13 cm    Left Ventricle Relative Wall  Thickness 0.49 cm    AV mean gradient 5 mmHg    AV valve area 1.54 cm2    AV Velocity Ratio 0.63     AV index (prosthetic) 0.68     MV mean gradient 2 mmHg    MV valve area p 1/2 method 2.61 cm2    MV valve area by continuity eq 1.35 cm2    E/A ratio 0.99     Mean e' 0.09 m/s    E wave deceleration time 247.41 msec    LVOT diameter 1.70 cm    LVOT area 2.3 cm2    LVOT peak noel 0.95 m/s    LVOT peak VTI 25.30 cm    Ao peak noel 1.51 m/s    Ao VTI 37.3 cm    Mr max noel 5.26 m/s    LVOT stroke volume 57.40 cm3    AV peak gradient 9 mmHg    MV peak gradient 5 mmHg    E/E' ratio 9.89 m/s    MV Peak E Noel 0.89 m/s    TR Max Noel 2.83 m/s    MV VTI 42.6 cm    MV stenosis pressure 1/2 time 84.29 ms    MV Peak A Noel 0.90 m/s    LV Systolic Volume 49.90 mL    LV Systolic Volume Index 24.1 mL/m2    LV Diastolic Volume 118.87 mL    LV Diastolic Volume Index 57.43 mL/m2    LV Mass Index 85 g/m2    RA Major Axis 4.19 cm    Left Atrium Minor Axis 3.22 cm    Left Atrium Major Axis 5.11 cm    Triscuspid Valve Regurgitation Peak Gradient 32 mmHg    LA Volume Index (Mod) 30.0 mL/m2    LA volume (mod) 62.00 cm3    RA Width 2.89 cm    Right Atrial Pressure (from IVC) 8 mmHg    EF 50 %    TV resting pulmonary artery pressure 40 mmHg    Narrative    · The left ventricle is normal in size with mild concentric hypertrophy.  · The estimated ejection fraction is 45-50%.  · Indeterminate left ventricular diastolic function.  · Mild mitral regurgitation.  · Normal right ventricular size with normal right ventricular systolic   function.  · Mild tricuspid regurgitation.  · The estimated PA systolic pressure is 40 mmHg.  · Intermediate central venous pressure (8 mmHg).          Imaging  Nuclear Stress Test    Result Date: 6/19/2023    The ECG portion of the study is negative for ischemia although technically uninterpretable due to LBBB on resting ECG.   The patient reported no chest pain during the stress test.   There were no arrhythmias during  stress.   The nuclear portion of this study will be reported separately.     NM Myocardial Perfusion Spect Multi Pharmacologic    Result Date: 6/19/2023  EXAMINATION: NM MYOCARDIAL PERFUSION SPECT MULTI PHARM CLINICAL HISTORY: Chest pain/anginal equiv, intermediate CAD risk, not treadmill candidate;  Bradycardia, unspecified TECHNIQUE: SPECT-CT images through the heart were acquired at rest following the intravenous administration of 10.1 mCi Tc-99m tetrofosmin. SPECT-CT images were then acquired during a Lexiscan cardiac stress following the intravenous administration of 33 mCi Tc-99m tetrofosmin. The clinical stress and ECG portion of the study will be interpreted separately. COMPARISON: None available FINDINGS: Focal hypokinesis in the anterior wall near the apex.  This corresponds to a small anterior wall perfusion defect near the apex which is slightly larger/more conspicuous on stress images.  Finding is present on both the attenuation corrected and non corrected images. Stress and rest end diastolic volumes 60 and 43 mL, respectively. Stress and rest end systolic volumes 23 and 19 mL, respectively. Estimated left ventricular ejection fraction 61% during stress and 56% during rest. TID: 1.06 Limited noncontrast CT demonstrates a small hiatal hernia and postop changes of prior gastric sleeve.     1. Suspect a small area reversible ischemia in the anterior wall near the apex with associated focal hypokinesis. 2. A small underlying fixed defect may indicate underlying infarct versus apical thinning. 3. Left ventricular ejection fraction 61% during stress and 56% during rest. This report was flagged in Epic as abnormal. Electronically signed by: Keegan Phoenix Date:    06/19/2023 Time:    16:44      Prior coronary angiogram / intervention:  N/A    Assessment and Plan  1. Coronary artery disease  Mild nonobstructive on angiogram  False-positive stress test  Continue aspirin statin    2. Dyspnea on exertion   Refer  to pulmonology  Significant smoking history  PASP 40 (borderline pHTN)    4. LBBB (left bundle branch block)  Noted    5. Mixed hyperlipidemia  Continue statin    6. Sinus bradycardia  Holter pending    Follow Up  3 months    Total professional time spent for the encounter: 30 minutes  Time was spent preparing to see the patient, reviewing results of prior testing, obtaining and/or reviewing separately obtained history, performing a medically appropriate examination and interview, counseling and educating the patient/family, ordering medications/tests/procedures, referring and communicating with other health care professionals, documenting clinical information in the electronic health record, and independently interpreting results.      Tarik Ng MD, FACC, RPVI  Interventional Cardiology

## 2023-09-19 DIAGNOSIS — R94.39 ABNORMAL CARDIOVASCULAR STRESS TEST: ICD-10-CM

## 2023-09-20 RX ORDER — ASPIRIN 81 MG/1
81 TABLET ORAL
Qty: 90 TABLET | Refills: 3 | Status: SHIPPED | OUTPATIENT
Start: 2023-09-20

## 2023-09-26 ENCOUNTER — OFFICE VISIT (OUTPATIENT)
Dept: PULMONOLOGY | Facility: CLINIC | Age: 63
End: 2023-09-26
Payer: COMMERCIAL

## 2023-09-26 VITALS
SYSTOLIC BLOOD PRESSURE: 108 MMHG | BODY MASS INDEX: 37.83 KG/M2 | DIASTOLIC BLOOD PRESSURE: 68 MMHG | HEART RATE: 72 BPM | OXYGEN SATURATION: 97 % | WEIGHT: 227.06 LBS | HEIGHT: 65 IN

## 2023-09-26 DIAGNOSIS — Z87.891 PERSONAL HISTORY OF NICOTINE DEPENDENCE: Primary | ICD-10-CM

## 2023-09-26 DIAGNOSIS — R06.09 DYSPNEA ON EXERTION: ICD-10-CM

## 2023-09-26 DIAGNOSIS — E66.01 CLASS 2 SEVERE OBESITY WITH SERIOUS COMORBIDITY AND BODY MASS INDEX (BMI) OF 37.0 TO 37.9 IN ADULT, UNSPECIFIED OBESITY TYPE: ICD-10-CM

## 2023-09-26 DIAGNOSIS — I27.20 PULMONARY HTN: ICD-10-CM

## 2023-09-26 PROCEDURE — 99999 PR PBB SHADOW E&M-EST. PATIENT-LVL IV: ICD-10-PCS | Mod: PBBFAC,,, | Performed by: INTERNAL MEDICINE

## 2023-09-26 PROCEDURE — 99204 PR OFFICE/OUTPT VISIT, NEW, LEVL IV, 45-59 MIN: ICD-10-PCS | Mod: S$PBB,,, | Performed by: INTERNAL MEDICINE

## 2023-09-26 PROCEDURE — 99999 PR PBB SHADOW E&M-EST. PATIENT-LVL IV: CPT | Mod: PBBFAC,,, | Performed by: INTERNAL MEDICINE

## 2023-09-26 PROCEDURE — 3074F SYST BP LT 130 MM HG: CPT | Mod: CPTII,S$PBB,, | Performed by: INTERNAL MEDICINE

## 2023-09-26 PROCEDURE — 3008F BODY MASS INDEX DOCD: CPT | Mod: CPTII,S$PBB,, | Performed by: INTERNAL MEDICINE

## 2023-09-26 PROCEDURE — 3074F PR MOST RECENT SYSTOLIC BLOOD PRESSURE < 130 MM HG: ICD-10-PCS | Mod: CPTII,S$PBB,, | Performed by: INTERNAL MEDICINE

## 2023-09-26 PROCEDURE — 3044F PR MOST RECENT HEMOGLOBIN A1C LEVEL <7.0%: ICD-10-PCS | Mod: CPTII,S$PBB,, | Performed by: INTERNAL MEDICINE

## 2023-09-26 PROCEDURE — 1159F PR MEDICATION LIST DOCUMENTED IN MEDICAL RECORD: ICD-10-PCS | Mod: CPTII,S$PBB,, | Performed by: INTERNAL MEDICINE

## 2023-09-26 PROCEDURE — 3008F PR BODY MASS INDEX (BMI) DOCUMENTED: ICD-10-PCS | Mod: CPTII,S$PBB,, | Performed by: INTERNAL MEDICINE

## 2023-09-26 PROCEDURE — 99204 OFFICE O/P NEW MOD 45 MIN: CPT | Mod: S$PBB,,, | Performed by: INTERNAL MEDICINE

## 2023-09-26 PROCEDURE — 1159F MED LIST DOCD IN RCRD: CPT | Mod: CPTII,S$PBB,, | Performed by: INTERNAL MEDICINE

## 2023-09-26 PROCEDURE — 3078F DIAST BP <80 MM HG: CPT | Mod: CPTII,S$PBB,, | Performed by: INTERNAL MEDICINE

## 2023-09-26 PROCEDURE — 3044F HG A1C LEVEL LT 7.0%: CPT | Mod: CPTII,S$PBB,, | Performed by: INTERNAL MEDICINE

## 2023-09-26 PROCEDURE — 3078F PR MOST RECENT DIASTOLIC BLOOD PRESSURE < 80 MM HG: ICD-10-PCS | Mod: CPTII,S$PBB,, | Performed by: INTERNAL MEDICINE

## 2023-09-26 RX ORDER — TRIAMCINOLONE ACETONIDE 1 MG/G
CREAM TOPICAL 2 TIMES DAILY
COMMUNITY
Start: 2023-09-06

## 2023-09-26 RX ORDER — LORATADINE 10 MG/1
10 TABLET ORAL
COMMUNITY
Start: 2023-09-20

## 2023-09-26 NOTE — PROGRESS NOTES
History & Physical  [facility]    SUBJECTIVE:     Chief Complaint:   Pulmonary Hypertension    History of Present Illness:  Destiny Carson is a 62 y.o. female who presents for evaluation of pulmonary hypertension (referred from cardiology).    The pt notes chronic shortness of breath. It doesn't bother her very much. Her reason for evaluation is more just to make sure there isn't anything wrong with her. She is not bothered by cough symptoms.    Started smoking at 17 years-old. She quit smoking at 57 years-old. On average, she smoked 1 PPD. This yields a history of ~40 pack-years.    No history of childhood asthma.    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    PMH: gastric sleeve surgery    Review of patient's allergies indicates:  No Known Allergies    Past Medical History:   Diagnosis Date    Coronary artery disease involving native coronary artery of native heart without angina pectoris 2023    Hyperlipidemia     Syncope and collapse     Thyroid disease      Past Surgical History:   Procedure Laterality Date    ANGIOGRAM, CORONARY ARTERY  08/15/2023     SECTION      COLONOSCOPY  2022    COLONOSCOPY N/A 2022    Procedure: COLONOSCOPY;  Surgeon: Anders Wilson MD;  Location: [facility] ENDO;  Service: Endoscopy;  Laterality: N/A;    CORONARY ANGIOGRAPHY Right 8/15/2023    Procedure: ANGIOGRAM, CORONARY ARTERY;  Surgeon: Tarik Ng MD;  Location: [facility] CATH LAB;  Service: Cardiology;  Laterality: Right;    ESOPHAGOGASTRODUODENOSCOPY      ESOPHAGOGASTRODUODENOSCOPY N/A 2022    Procedure: EGD (ESOPHAGOGASTRODUODENOSCOPY);  Surgeon: Anders Wilson MD;  Location: [facility] ENDO;  Service: Endoscopy;  Laterality: N/A;    FOOT SURGERY Left     screws pins in foot.    gastric sleeve  2017     Family History   Problem Relation Age of Onset    Diabetes Mother     Heart disease Mother     Dementia Mother     Heart attack Father     Cancer Father         rectal    Hypertension Father      Heart disease Father     Diabetes Father     Kidney disease Brother      Social History     Socioeconomic History    Marital status: Single   Occupational History    Occupation: data input for [employer]     Employer: [employer]   Tobacco Use    Smoking status: Former     Current packs/day: 1.00     Average packs/day: 1 pack/day for 40.0 years (40.0 ttl pk-yrs)     Types: Cigarettes    Smokeless tobacco: Former     Quit date: 3/16/2016   Substance and Sexual Activity    Alcohol use: Yes     Alcohol/week: 1.0 standard drink of alcohol     Types: 1 Glasses of wine per week     Comment: occasionally    Drug use: No    Sexual activity: Never     Partners: Male     Birth control/protection: None     Social Determinants of Health     Financial Resource Strain: Medium Risk (6/22/2023)    Overall Financial Resource Strain (CARDIA)     Difficulty of Paying Living Expenses: Somewhat hard   Food Insecurity: No Food Insecurity (6/22/2023)    Hunger Vital Sign     Worried About Running Out of Food in the Last Year: Never true     Ran Out of Food in the Last Year: Never true   Transportation Needs: No Transportation Needs (6/22/2023)    PRAPARE - Transportation     Lack of Transportation (Medical): No     Lack of Transportation (Non-Medical): No   Physical Activity: Insufficiently Active (6/22/2023)    Exercise Vital Sign     Days of Exercise per Week: 2 days     Minutes of Exercise per Session: 20 min   Stress: Stress Concern Present (6/22/2023)    [place] [employer] - Occupational Stress Questionnaire     Feeling of Stress : Very much   Social Connections: Unknown (6/22/2023)    Social Connection and Isolation Panel [NHANES]     Frequency of Communication with Friends and Family: More than three times a week     Frequency of Social Gatherings with Friends and Family: More than three times a week     Active Member of Clubs or Organizations: Yes     Attends Club or Organization Meetings: 1 to 4  "times per year     Marital Status:    Housing Stability: Low Risk  (6/22/2023)    Housing Stability Vital Sign     Unable to Pay for Housing in the Last Year: No     Number of Places Lived in the Last Year: 2     Unstable Housing in the Last Year: No     Review of Systems:  No pertinent positives.    OBJECTIVE:     Vital Signs  Vitals:    09/26/23 1315   BP: 108/68   BP Location: Right arm   Patient Position: Sitting   BP Method: Medium (Automatic)   Pulse: 72   SpO2: 97%   Weight: 103 kg (227 lb 1.2 oz)   Height: 5' 5" (1.651 m)     Body mass index is 37.79 kg/m².    Physical Exam:  General: no distress  Eyes:  conjunctivae/corneas clear  Nose: no discharge  Neck: trachea midline with no masses appreciated  Lungs:  normal respiratory effort, no wheezes, no rales  Heart: regular rate and rhythm and no murmur  Abdomen: non-distended  Extremities: no cyanosis, no edema, no clubbing  Skin: No rashes or lesions. good skin turgor  Neurologic: alert, oriented, thought content appropriate    Laboratory:  Lab Results   Component Value Date    WBC 8.66 08/11/2023    HGB 13.3 08/11/2023    HCT 40.7 08/11/2023    MCV 90 08/11/2023     08/11/2023     Chest Imaging, My Impression:   No xray on file    Diagnostic Results:      No PFT on file.    ASSESSMENT/PLAN:     Shortness of breath  - Recommend PFT now.    Obesity  - History of gastric sleeve. Counseled pt on weight loss & lung function.    Pulmonary hypertension  - Minimal symptoms. PASP is mildly elevated. Recommend repeat echo in two years.    Personal History of cigarette nicotine dependence  - Recommend annual low dose CT to begin now.    Blake Cueva MD  [facility]      "

## 2023-09-28 ENCOUNTER — OFFICE VISIT (OUTPATIENT)
Dept: PODIATRY | Facility: CLINIC | Age: 63
End: 2023-09-28
Payer: COMMERCIAL

## 2023-09-28 VITALS
DIASTOLIC BLOOD PRESSURE: 74 MMHG | BODY MASS INDEX: 37.87 KG/M2 | SYSTOLIC BLOOD PRESSURE: 141 MMHG | HEART RATE: 55 BPM | WEIGHT: 227.31 LBS | HEIGHT: 65 IN

## 2023-09-28 DIAGNOSIS — M79.672 ARCH PAIN OF LEFT FOOT: Primary | ICD-10-CM

## 2023-09-28 DIAGNOSIS — M12.572 TRAUMATIC ARTHROPATHY OF LEFT FOOT: ICD-10-CM

## 2023-09-28 DIAGNOSIS — S93.325S LISFRANC DISLOCATION, LEFT, SEQUELA: ICD-10-CM

## 2023-09-28 DIAGNOSIS — M72.2 PLANTAR FASCIITIS: ICD-10-CM

## 2023-09-28 DIAGNOSIS — Z98.890 HISTORY OF OPEN REDUCTION AND INTERNAL FIXATION (ORIF) PROCEDURE: ICD-10-CM

## 2023-09-28 PROCEDURE — 3077F SYST BP >= 140 MM HG: CPT | Mod: CPTII,S$GLB,, | Performed by: PODIATRIST

## 2023-09-28 PROCEDURE — 99203 PR OFFICE/OUTPT VISIT, NEW, LEVL III, 30-44 MIN: ICD-10-PCS | Mod: S$GLB,,, | Performed by: PODIATRIST

## 2023-09-28 PROCEDURE — 99203 OFFICE O/P NEW LOW 30 MIN: CPT | Mod: S$GLB,,, | Performed by: PODIATRIST

## 2023-09-28 PROCEDURE — 1159F PR MEDICATION LIST DOCUMENTED IN MEDICAL RECORD: ICD-10-PCS | Mod: CPTII,S$GLB,, | Performed by: PODIATRIST

## 2023-09-28 PROCEDURE — 99999 PR PBB SHADOW E&M-EST. PATIENT-LVL IV: CPT | Mod: PBBFAC,,, | Performed by: PODIATRIST

## 2023-09-28 PROCEDURE — 3008F BODY MASS INDEX DOCD: CPT | Mod: CPTII,S$GLB,, | Performed by: PODIATRIST

## 2023-09-28 PROCEDURE — 3044F HG A1C LEVEL LT 7.0%: CPT | Mod: CPTII,S$GLB,, | Performed by: PODIATRIST

## 2023-09-28 PROCEDURE — 1159F MED LIST DOCD IN RCRD: CPT | Mod: CPTII,S$GLB,, | Performed by: PODIATRIST

## 2023-09-28 PROCEDURE — 99999 PR PBB SHADOW E&M-EST. PATIENT-LVL IV: ICD-10-PCS | Mod: PBBFAC,,, | Performed by: PODIATRIST

## 2023-09-28 PROCEDURE — 3008F PR BODY MASS INDEX (BMI) DOCUMENTED: ICD-10-PCS | Mod: CPTII,S$GLB,, | Performed by: PODIATRIST

## 2023-09-28 PROCEDURE — 3077F PR MOST RECENT SYSTOLIC BLOOD PRESSURE >= 140 MM HG: ICD-10-PCS | Mod: CPTII,S$GLB,, | Performed by: PODIATRIST

## 2023-09-28 PROCEDURE — 3078F PR MOST RECENT DIASTOLIC BLOOD PRESSURE < 80 MM HG: ICD-10-PCS | Mod: CPTII,S$GLB,, | Performed by: PODIATRIST

## 2023-09-28 PROCEDURE — 3044F PR MOST RECENT HEMOGLOBIN A1C LEVEL <7.0%: ICD-10-PCS | Mod: CPTII,S$GLB,, | Performed by: PODIATRIST

## 2023-09-28 PROCEDURE — 3078F DIAST BP <80 MM HG: CPT | Mod: CPTII,S$GLB,, | Performed by: PODIATRIST

## 2023-09-28 NOTE — PATIENT INSTRUCTIONS
Pick one & use for at least 3-6 months (for your fungal nails):    1. Listerine mouthwash (yellow/gold) - soak for 5-10minutes daily (may re-use solution up to a week)    2. Vicks Vaporub to nails daily after a bath/end of day/bedtime.    3. Lamisil (terbanafine) 1% antifungal cream daily to nails after shower.         Voltaren gel 1% to L foot up to four times a day        Visco-gel Achilles protection sleeve - large/Xlarge    Visco-gel universal metatarsal strap - small/medium left

## 2023-09-28 NOTE — PROGRESS NOTES
Subjective:      Patient ID: Destiny Carson is a 62 y.o. female.    Chief Complaint: Foot Pain    Destiny is a 62 y.o. female who presents new to the podiatry clinic  w/ c/o L foot pain & numbness. Onset of the symptoms was  Nov.2 yrs.ago when fell & broke her foot. Had surgery @ [facility] Dec.2021. Since then, apparently has feeling of a loose staple & foot stays swollen - shoe pressur w/ shoe laces hurt foot. Also, pain w/ ambulation over uneven surfaces.  Pain level 6/10 @ worst.     Has a desk job - .    Shoe gear: sandals but flat slippers in house.    PCP Randell Huynh MD 8/3/23    Past Medical History:   Diagnosis Date    Coronary artery disease involving native coronary artery of native heart without angina pectoris 8/29/2023    Hyperlipidemia     Syncope and collapse     Thyroid disease      Patient Active Problem List   Diagnosis    Pes planus (flat feet)    Plantar fasciitis    S/P gastric surgery    Elevated LDL cholesterol level    Vitamin D deficiency    S/P colonoscopy with polypectomy    Epigastric pain    Screen for colon cancer    LBBB (left bundle branch block)    Dyspnea on exertion    Sinus bradycardia    Acquired hypothyroidism    Mixed hyperlipidemia    Cholelithiasis    Abdominal aortic atherosclerosis    HLD (hyperlipidemia)    Abnormal cardiovascular stress test    Coronary artery disease involving native coronary artery of native heart without angina pectoris    Pulmonary HTN      Objective:      X-Ray Foot Complete Left  Order: [id number]  Impression    Postoperative changes of prior Lisfranc fixation without evidence of hardware complication.     Preliminary Report Dictated By: STEPHY REGAN DO     Electronically Signed By: Anders Cui MD 6/21/2022 11:02 AM CDT  Narrative    [facility] [place] XR FOOT 3+ VW LEFT     ICD10: S93.326A   Traumatic dislocation of tarsometatarsal (TMT) joint   REASON FOR STUDY: .   PROVIDER COMMENTS:     COMPARISON: Radiograph of the left foot 3/15/2022.      FINDINGS:     Fixation hardware in the setting of prior Lisfranc injury repair is again seen. The hardware is unchanged in position without evidence of focal surrounding lucency. Bony demineralization most prominent at the base of the 1st metatarsal. Joint alignment is unchanged. No acute focal soft tissue abnormality.  Exam End: 06/21/22 08:22    Specimen Collected: 06/21/22 08:23 Last Resulted: 06/21/22 11:02   Received From: [facility]  Result Received: 08/29/23 12:54     X-Ray Foot Complete Left  Order: [id number]  Status: Edited Result - FINAL     Visible to patient: Yes (not seen)     Next appt: 10/26/2023 at 12:45 PM in Family Medicine (Randell Huynh MD)     0 Result Notes  Details  Reading Physician Reading Date Result Priority   Danie Luis MD  [phone] 11/27/2021 STAT     Addenda     There is undulation at the base of the 1st metatarsal, no definite acute displaced fracture or dislocation.  Correlation however is advised as lucency appears to be vertically oriented on the lateral view.  CT could be obtained for definitive exclusion of fracture as warranted.        Electronically signed by: Danie Luis MD  Date:                                            11/27/2021  Time:                                           16:46  Signed by Danie Luis MD on 11/27/2021 16:48  Narrative & Impression  EXAMINATION:  XR FOOT COMPLETE 3 VIEW LEFT     CLINICAL HISTORY:  .  Pain, unspecified     TECHNIQUE:  AP, lateral and oblique views of the left foot were performed.     COMPARISON:  None     FINDINGS:  Three views left foot.     No acute displaced fracture or dislocation of the foot.  No radiopaque foreign body.  No significant edema.     Impression:     1. No acute displaced fracture or dislocation of the foot.     Electronically signed by: Danie Luis MD  Date:                                            11/27/2021  Time:                                           15:23   I  independently reviewed the Xray images. Enlarges/ accessory navicular L.    Review of Systems   Constitutional: Negative for malaise/fatigue.   Skin:  Negative for dry skin and poor wound healing.   Musculoskeletal:  Positive for falls (see hpi), joint pain and myalgias. Negative for muscle weakness.   Neurological:  Positive for numbness. Negative for focal weakness, paresthesias and weakness.   Psychiatric/Behavioral:  The patient is not nervous/anxious.      Physical Exam  Vitals reviewed.   Constitutional:       General: She is not in acute distress.     Appearance: She is well-developed. She is morbidly obese.   Cardiovascular:      Pulses: Normal pulses.           Dorsalis pedis pulses are 2+ on the right side and 2+ on the left side.   Musculoskeletal:         General: Swelling, tenderness and signs of injury present.      Right lower leg: No edema.      Left lower leg: No edema.      Right foot: Deformity present.      Left foot: Deformity (pes planus B/L L>R) present.   Feet:      Comments: Equinus B/L ankles w/ < 90 deg foot to leg noted w/ knees extended.      MS strength of extrinsics to foot & ankle B/L + 5/5 in DF/PF/Inv/Ev to resistance w/ no reproduction of pain in any direction.  Tenderness medial navicular L w/ no radiation proximal or distally.     Passive ROM of ankle & pedal joints is painless & w/out crepitation B/L.  No hypermobility nor instability noted base met./ dorsal cuneiforms L.     Skin:     General: Skin is warm and dry.      Capillary Refill: Capillary refill takes less than 2 seconds.      Findings: No bruising or erythema.   Neurological:      Mental Status: She is alert and oriented to person, place, and time.      Motor: No weakness.      Gait: Gait normal.   Psychiatric:         Mood and Affect: Mood and affect normal.         Speech: Speech is rapid and pressured.         Behavior: Behavior normal. Behavior is cooperative.         Assessment:      Encounter Diagnoses   Name  Primary?    Arch pain of left foot Yes    History of open reduction and internal fixation (ORIF) procedure     Plantar fasciitis     Traumatic arthropathy of left foot     Lisfranc dislocation, left, sequela        Problem List Items Addressed This Visit          Orthopedic    Plantar fasciitis     Other Visit Diagnoses       Arch pain of left foot    -  Primary    History of open reduction and internal fixation (ORIF) procedure        Traumatic arthropathy of left foot        Lisfranc dislocation, left, sequela               Plan:       Destiny was seen today for foot pain.    Diagnoses and all orders for this visit:    Arch pain of left foot  -     Ambulatory referral/consult to Podiatry    History of open reduction and internal fixation (ORIF) procedure    Plantar fasciitis    Traumatic arthropathy of left foot    Lisfranc dislocation, left, sequela    I counseled the patient on her conditions, their implications and medical management.    - Shoe inspection. Patient instructed on proper supportive shoe gear @ all times WB.  Dispensed PPT arch pads & Silopos gelstrap as options for MLA support L.    May use OTC Voltaren gel up to qid prn.    F/u 4-6 wks., [facility]ner prn.          A total of 34 mins.was spent on chart review, patient visit & documentation.

## 2023-11-13 ENCOUNTER — OFFICE VISIT (OUTPATIENT)
Dept: PODIATRY | Facility: CLINIC | Age: 63
End: 2023-11-13
Payer: COMMERCIAL

## 2023-11-13 VITALS
HEIGHT: 65 IN | DIASTOLIC BLOOD PRESSURE: 63 MMHG | BODY MASS INDEX: 38.5 KG/M2 | WEIGHT: 231.06 LBS | HEART RATE: 49 BPM | SYSTOLIC BLOOD PRESSURE: 120 MMHG

## 2023-11-13 DIAGNOSIS — T84.199S: ICD-10-CM

## 2023-11-13 DIAGNOSIS — M72.2 PLANTAR FASCIITIS: ICD-10-CM

## 2023-11-13 DIAGNOSIS — M25.572 PAIN IN LEFT ANKLE AND JOINTS OF LEFT FOOT: Primary | ICD-10-CM

## 2023-11-13 DIAGNOSIS — G57.92 PERIPHERAL NEURITIS OF LEFT FOOT: ICD-10-CM

## 2023-11-13 DIAGNOSIS — M12.572 TRAUMATIC ARTHROPATHY OF LEFT FOOT: ICD-10-CM

## 2023-11-13 DIAGNOSIS — S93.325S LISFRANC DISLOCATION, LEFT, SEQUELA: ICD-10-CM

## 2023-11-13 DIAGNOSIS — Z98.890 HISTORY OF OPEN REDUCTION AND INTERNAL FIXATION (ORIF) PROCEDURE: ICD-10-CM

## 2023-11-13 PROCEDURE — 3044F HG A1C LEVEL LT 7.0%: CPT | Mod: CPTII,S$GLB,, | Performed by: PODIATRIST

## 2023-11-13 PROCEDURE — 99999 PR PBB SHADOW E&M-EST. PATIENT-LVL IV: ICD-10-PCS | Mod: PBBFAC,,, | Performed by: PODIATRIST

## 2023-11-13 PROCEDURE — 3008F PR BODY MASS INDEX (BMI) DOCUMENTED: ICD-10-PCS | Mod: CPTII,S$GLB,, | Performed by: PODIATRIST

## 2023-11-13 PROCEDURE — 3078F PR MOST RECENT DIASTOLIC BLOOD PRESSURE < 80 MM HG: ICD-10-PCS | Mod: CPTII,S$GLB,, | Performed by: PODIATRIST

## 2023-11-13 PROCEDURE — 3074F PR MOST RECENT SYSTOLIC BLOOD PRESSURE < 130 MM HG: ICD-10-PCS | Mod: CPTII,S$GLB,, | Performed by: PODIATRIST

## 2023-11-13 PROCEDURE — 1159F MED LIST DOCD IN RCRD: CPT | Mod: CPTII,S$GLB,, | Performed by: PODIATRIST

## 2023-11-13 PROCEDURE — 3044F PR MOST RECENT HEMOGLOBIN A1C LEVEL <7.0%: ICD-10-PCS | Mod: CPTII,S$GLB,, | Performed by: PODIATRIST

## 2023-11-13 PROCEDURE — 99214 OFFICE O/P EST MOD 30 MIN: CPT | Mod: S$GLB,,, | Performed by: PODIATRIST

## 2023-11-13 PROCEDURE — 99214 PR OFFICE/OUTPT VISIT, EST, LEVL IV, 30-39 MIN: ICD-10-PCS | Mod: S$GLB,,, | Performed by: PODIATRIST

## 2023-11-13 PROCEDURE — 3074F SYST BP LT 130 MM HG: CPT | Mod: CPTII,S$GLB,, | Performed by: PODIATRIST

## 2023-11-13 PROCEDURE — 1159F PR MEDICATION LIST DOCUMENTED IN MEDICAL RECORD: ICD-10-PCS | Mod: CPTII,S$GLB,, | Performed by: PODIATRIST

## 2023-11-13 PROCEDURE — 3008F BODY MASS INDEX DOCD: CPT | Mod: CPTII,S$GLB,, | Performed by: PODIATRIST

## 2023-11-13 PROCEDURE — 3078F DIAST BP <80 MM HG: CPT | Mod: CPTII,S$GLB,, | Performed by: PODIATRIST

## 2023-11-13 PROCEDURE — 99999 PR PBB SHADOW E&M-EST. PATIENT-LVL IV: CPT | Mod: PBBFAC,,, | Performed by: PODIATRIST

## 2023-11-13 NOTE — PROGRESS NOTES
Subjective:      Patient ID: Destiny Carson is a 62 y.o. female.    Chief Complaint: Follow-up    Patient is here for 3 wk.f/u L midfoot pain as well as associated neuritis L. States pads too thick in shoes & arches rubbed laterally so limited in shoes & modification thus far. Using gelstrap in slippers @ home. Has pair of Skechers arch fit that is used mainly for work; better dorsal midfoot as has stretch top instead of laces, but still get soreness by end of day.  9/28/23  Destiny is a 62 y.o. female who presents new to the podiatry clinic  w/ c/o L foot pain & numbness. Onset of the symptoms was  Nov.2 yrs.ago when fell & broke her foot. Had surgery @ [facility] Dec.2021. Since then, apparently has feeling of a loose staple & foot stays swollen - shoe pressur w/ shoe laces hurt foot. Also, pain w/ ambulation over uneven surfaces.  Pain level 6/10 @ worst.     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     Lives in [place] 6 months but from [place] so [place]    Shoe gear: Skechers arch fit athletic shoes (for work); flat slippers in house.    PCP Randell Huynh MD 10/26/23    Past Medical History:   Diagnosis Date    Coronary artery disease involving native coronary artery of native heart without angina pectoris 8/29/2023    Hyperlipidemia     Syncope and collapse     Thyroid disease      Patient Active Problem List   Diagnosis    Pes planus (flat feet)    Plantar fasciitis    S/P gastric surgery    Elevated LDL cholesterol level    Vitamin D deficiency    S/P colonoscopy with polypectomy    Epigastric pain    Screen for colon cancer    LBBB (left bundle branch block)    Dyspnea on exertion    Sinus bradycardia    Acquired hypothyroidism    Mixed hyperlipidemia    Cholelithiasis    Abdominal aortic atherosclerosis    HLD (hyperlipidemia)    Abnormal cardiovascular stress test    Coronary artery disease involving native coronary artery of native  heart without angina pectoris    Pulmonary HTN      Objective:      X-Ray Foot Complete Left  Narrative: EXAMINATION:  XR FOOT COMPLETE 3 VIEW LEFT    CLINICAL HISTORY:  .  Pain in left ankle and joints of left foot    TECHNIQUE:  AP, lateral and oblique views of the left foot were performed.    COMPARISON:  11/27/2021    FINDINGS:  Postsurgical change prior instrumented fusion the level of the midfoot and involving the 1st through 3rd tarsometatarsal joints as well as the distal metatarsal row.  There is apparent discontinuity of the fusion devices spanning the 1st and 3rd tarsometatarsal joints.  There is also some surrounding lucency evident on the lateral radiograph keeping with mechanical loosening..    There is no evidence of an acute displaced fracture.  Alignment appears within normal limits.    Modest scattered degenerative change.  Mild calcaneal enthesopathy.  No focal soft tissue swelling identified.  Impression: Postsurgical change with evidence of hardware failure.  Clinical correlation advised with cross-sectional imaging for further workup if warranted.    This report was flagged in Epic as abnormal.    Electronically signed by: Keegan Reeves MD  Date:    11/14/2023  Time:    08:00   Reviewed the x-ray images.  As expected, failure of hardware as well as prominence dorsal tarsal-met.jt., consistent w/ sx & motion across the 1st & 2nd met.cuneiform jt.    X-Ray Foot Complete Left  Order: [id number]  Impression    Postoperative changes of prior Lisfranc fixation without evidence of hardware complication.     Preliminary Report Dictated By: STEPHY REGAN DO     Electronically Signed By: Anders Cui MD 6/21/2022 11:02 AM CDT  Narrative    [facility] [place] FOOT 3+ VW LEFT     ICD10: S93.326A   Traumatic dislocation of tarsometatarsal (TMT) joint   REASON FOR STUDY: .   PROVIDER COMMENTS:     COMPARISON: Radiograph of the left foot 3/15/2022.     FINDINGS:     Fixation hardware in the setting of prior Lisfranc  injury repair is again seen. The hardware is unchanged in position without evidence of focal surrounding lucency. Bony demineralization most prominent at the base of the 1st metatarsal. Joint alignment is unchanged. No acute focal soft tissue abnormality.  Exam End: 06/21/22 08:22    Specimen Collected: 06/21/22 08:23 Last Resulted: 06/21/22 11:02   Received From: [facility] [place] [employer]  Result Received: 08/29/23 12:54     X-Ray Foot Complete Left  Order: [id number]  Status: Edited Result - FINAL     Visible to patient: Yes (not seen)     Next appt: 10/26/2023 at 12:45 PM in Family Medicine (Randell Huynh MD)     0 Result Notes  Details  Reading Physician Reading Date Result Priority   Danie Luis MD  [phone] 11/27/2021 STAT     Addenda     There is undulation at the base of the 1st metatarsal, no definite acute displaced fracture or dislocation.  Correlation however is advised as lucency appears to be vertically oriented on the lateral view.  CT could be obtained for definitive exclusion of fracture as warranted.        Electronically signed by: Danie Luis MD  Date:                                            11/27/2021  Time:                                           16:46  Signed by Danie Luis MD on 11/27/2021 16:48  Narrative & Impression  EXAMINATION:  XR FOOT COMPLETE 3 VIEW LEFT     CLINICAL HISTORY:  .  Pain, unspecified     TECHNIQUE:  AP, lateral and oblique views of the left foot were performed.     COMPARISON:  None     FINDINGS:  Three views left foot.     No acute displaced fracture or dislocation of the foot.  No radiopaque foreign body.  No significant edema.     Impression:     1. No acute displaced fracture or dislocation of the foot.     Electronically signed by: Danie Luis MD  Date:                                            11/27/2021  Time:                                           15:23   I independently reviewed the Xray images. Enlarged navicular L.    Review of  Systems   Constitutional: Negative for malaise/fatigue.   Skin:  Negative for dry skin and poor wound healing.   Musculoskeletal:  Positive for falls (see hpi), joint pain and myalgias. Negative for muscle weakness.   Neurological:  Positive for numbness. Negative for focal weakness, paresthesias and weakness.   Psychiatric/Behavioral:  The patient is not nervous/anxious.      Physical Exam  Vitals reviewed.   Constitutional:       Appearance: She is well-developed. She is morbidly obese.   Cardiovascular:      Pulses: Normal pulses.           Dorsalis pedis pulses are 2+ on the right side and 2+ on the left side.   Musculoskeletal:         General: Swelling, tenderness and signs of injury (remote (2 yrs.ago Nov; ORIF Dec.2021) L midfoot) present.      Right lower leg: No edema.      Left lower leg: No edema.      Right foot: Deformity present.      Left foot: Deformity (pes planus B/L L>R but slight MLA contour NWB) present.        Feet:    Feet:      Comments: Equinus B/L ankles w/ < 90 deg foot to leg noted w/ knees extended.      MS strength of extrinsics to foot & ankle B/L + 5/5 in DF/PF/Inv/Ev to resistance w/ no reproduction of pain in any direction.  Tenderness medial navicular L (?PT tendon insertion) w/ no radiation proximal or distally.   Bony prominence &/ or hardware dorsal medial midfoot L.    Passive ROM of ankle & pedal joints is painless & w/out crepitation B/L.  No hypermobility nor instability noted base met./ dorsal cuneiforms L.     Skin:     General: Skin is warm and dry.      Capillary Refill: Capillary refill takes less than 2 seconds.      Findings: No bruising or erythema.   Neurological:      Mental Status: She is alert and oriented to person, place, and time.      Sensory: No sensory deficit.      Motor: Motor function is intact. No weakness or abnormal muscle tone.      Gait: Gait is intact. Gait normal.      Comments: Paresthesias including numbness & 'stinging' L dorsomedial midfoot  w/ TTP & fullness met.cuneiform joint dorsally, along MDCN. No radiation proximally nor distally.   Psychiatric:         Mood and Affect: Mood and affect normal.         Speech: Speech is rapid and pressured.         Behavior: Behavior normal. Behavior is cooperative.         Assessment:      Encounter Diagnoses   Name Primary?    Pain in left ankle and joints of left foot Yes    Peripheral neuritis of left foot     History of open reduction and internal fixation (ORIF) procedure     [facility] compl of int fix of unsp bone of limb, sequela     Lisfranc dislocation, left, sequela     Traumatic arthropathy of left foot     Plantar fasciitis        Problem List Items Addressed This Visit          Orthopedic    Plantar fasciitis     Other Visit Diagnoses       Pain in left ankle and joints of left foot    -  Primary    Peripheral neuritis of left foot        History of open reduction and internal fixation (ORIF) procedure        [facility] compl of int fix of unsp bone of limb, sequela        Lisfranc dislocation, left, sequela        Traumatic arthropathy of left foot              Plan:      I counseled the patient on her conditions, their implications & medical mgmt.    - Shoe inspection. Patient instructed on proper supportive shoe gear @ all times WB.  Added PPT arch pad to removable inserts of [employer] software archfit shoes (patient finds more comfortable).  Continue Silopos gelstrap as MLA support L in slippesr.    Continue Voltaren gel up to qid prn.    Xray to evaluatepain in area surrounding remote ORIF midfoot L.    F/u 4-6 wks., sooner prn.          A total of 34 mins.was spent on chart review, patient visit & documentation.

## 2023-11-29 ENCOUNTER — OFFICE VISIT (OUTPATIENT)
Dept: CARDIOLOGY | Facility: CLINIC | Age: 63
End: 2023-11-29
Payer: COMMERCIAL

## 2023-11-29 VITALS
BODY MASS INDEX: 38.64 KG/M2 | HEART RATE: 54 BPM | WEIGHT: 231.94 LBS | DIASTOLIC BLOOD PRESSURE: 80 MMHG | HEIGHT: 65 IN | OXYGEN SATURATION: 96 % | SYSTOLIC BLOOD PRESSURE: 134 MMHG

## 2023-11-29 DIAGNOSIS — E78.2 MIXED HYPERLIPIDEMIA: ICD-10-CM

## 2023-11-29 DIAGNOSIS — I27.20 PULMONARY HTN: ICD-10-CM

## 2023-11-29 DIAGNOSIS — R94.39 ABNORMAL CARDIOVASCULAR STRESS TEST: ICD-10-CM

## 2023-11-29 DIAGNOSIS — I25.10 CORONARY ARTERY DISEASE INVOLVING NATIVE CORONARY ARTERY OF NATIVE HEART WITHOUT ANGINA PECTORIS: ICD-10-CM

## 2023-11-29 DIAGNOSIS — R00.1 SINUS BRADYCARDIA: ICD-10-CM

## 2023-11-29 DIAGNOSIS — I44.7 LBBB (LEFT BUNDLE BRANCH BLOCK): ICD-10-CM

## 2023-11-29 DIAGNOSIS — I70.0 ABDOMINAL AORTIC ATHEROSCLEROSIS: Primary | ICD-10-CM

## 2023-11-29 PROCEDURE — 3008F PR BODY MASS INDEX (BMI) DOCUMENTED: ICD-10-PCS | Mod: CPTII,S$GLB,, | Performed by: INTERNAL MEDICINE

## 2023-11-29 PROCEDURE — 3079F PR MOST RECENT DIASTOLIC BLOOD PRESSURE 80-89 MM HG: ICD-10-PCS | Mod: CPTII,S$GLB,, | Performed by: INTERNAL MEDICINE

## 2023-11-29 PROCEDURE — 1159F PR MEDICATION LIST DOCUMENTED IN MEDICAL RECORD: ICD-10-PCS | Mod: CPTII,S$GLB,, | Performed by: INTERNAL MEDICINE

## 2023-11-29 PROCEDURE — 3075F SYST BP GE 130 - 139MM HG: CPT | Mod: CPTII,S$GLB,, | Performed by: INTERNAL MEDICINE

## 2023-11-29 PROCEDURE — 99999 PR PBB SHADOW E&M-EST. PATIENT-LVL III: ICD-10-PCS | Mod: PBBFAC,,, | Performed by: INTERNAL MEDICINE

## 2023-11-29 PROCEDURE — 3079F DIAST BP 80-89 MM HG: CPT | Mod: CPTII,S$GLB,, | Performed by: INTERNAL MEDICINE

## 2023-11-29 PROCEDURE — 99214 OFFICE O/P EST MOD 30 MIN: CPT | Mod: S$GLB,,, | Performed by: INTERNAL MEDICINE

## 2023-11-29 PROCEDURE — 1160F RVW MEDS BY RX/DR IN RCRD: CPT | Mod: CPTII,S$GLB,, | Performed by: INTERNAL MEDICINE

## 2023-11-29 PROCEDURE — 1160F PR REVIEW ALL MEDS BY PRESCRIBER/CLIN PHARMACIST DOCUMENTED: ICD-10-PCS | Mod: CPTII,S$GLB,, | Performed by: INTERNAL MEDICINE

## 2023-11-29 PROCEDURE — 3044F HG A1C LEVEL LT 7.0%: CPT | Mod: CPTII,S$GLB,, | Performed by: INTERNAL MEDICINE

## 2023-11-29 PROCEDURE — 99214 PR OFFICE/OUTPT VISIT, EST, LEVL IV, 30-39 MIN: ICD-10-PCS | Mod: S$GLB,,, | Performed by: INTERNAL MEDICINE

## 2023-11-29 PROCEDURE — 1159F MED LIST DOCD IN RCRD: CPT | Mod: CPTII,S$GLB,, | Performed by: INTERNAL MEDICINE

## 2023-11-29 PROCEDURE — 3044F PR MOST RECENT HEMOGLOBIN A1C LEVEL <7.0%: ICD-10-PCS | Mod: CPTII,S$GLB,, | Performed by: INTERNAL MEDICINE

## 2023-11-29 PROCEDURE — 99999 PR PBB SHADOW E&M-EST. PATIENT-LVL III: CPT | Mod: PBBFAC,,, | Performed by: INTERNAL MEDICINE

## 2023-11-29 PROCEDURE — 3075F PR MOST RECENT SYSTOLIC BLOOD PRESS GE 130-139MM HG: ICD-10-PCS | Mod: CPTII,S$GLB,, | Performed by: INTERNAL MEDICINE

## 2023-11-29 PROCEDURE — 3008F BODY MASS INDEX DOCD: CPT | Mod: CPTII,S$GLB,, | Performed by: INTERNAL MEDICINE

## 2023-11-29 NOTE — PROGRESS NOTES
[facility]  Cardiology Clinic Note      Chief Complaint  Chief Complaint   Patient presents with    Follow-up       HPI:      62-year-old female with past medical history status post gastric sleeve, syncope, hypothyroidism, left bundle branch block, prior tobacco (significant history), hyperlipidemia, hypertension, MPI 06/2023 reversible apical defect normal EF coronary angiogram 08/2023 mild nonobstructive coronary artery disease, echo 07/2023 EF estimated 45-50% diastolic function indeterminate mild mitral valve regurgitation normal RV mild tricuspid regurgitation PASP 40 CVP 8    EF normal and MPI and thought to be mildly decreased low normal on echo  Seen previously for dyspnea and chest discomfort in addition to abnormal MPI taken for coronary angiogram no obstructive disease  Sent to pulmonology for dyspnea and borderline pulmonary hypertension on echo  Also ordered Holter    Medications  Current Outpatient Medications   Medication Sig Dispense Refill    aspirin (ECOTRIN) 81 MG EC tablet TAKE 1 TABLET BY MOUTH EVERY DAY 90 tablet 3    ergocalciferol (ERGOCALCIFEROL) 50,000 unit Cap Take 1 capsule (50,000 Units total) by mouth every 7 days. 12 capsule 0    EScitalopram oxalate (LEXAPRO) 20 MG tablet Take 1 tablet (20 mg total) by mouth once daily. 90 tablet 0    levothyroxine (SYNTHROID) 25 MCG tablet Take 1 tablet (25 mcg total) by mouth before breakfast. 30 tablet 2    loratadine (CLARITIN) 10 mg tablet Take 10 mg by mouth.      pravastatin (PRAVACHOL) 10 MG tablet Take 1 tablet (10 mg total) by mouth once daily. 90 tablet 0    triamcinolone acetonide 0.1% (KENALOG) 0.1 % cream Apply topically 2 (two) times daily.       No current facility-administered medications for this visit.     Facility-Administered Medications Ordered in Other Visits   Medication Dose Route Frequency Provider Last Rate Last Admin    lactated ringers infusion   Intravenous Continuous Anders Wilson MD         sodium chloride 0.9% flush 10 mL  10 mL Intravenous PRN Anders Wilson MD            History  Past Medical History:   Diagnosis Date    Coronary artery disease involving native coronary artery of native heart without angina pectoris 2023    Hyperlipidemia     Syncope and collapse     Thyroid disease      Past Surgical History:   Procedure Laterality Date    ANGIOGRAM, CORONARY ARTERY  08/15/2023     SECTION      COLONOSCOPY  2022    COLONOSCOPY N/A 2022    Procedure: COLONOSCOPY;  Surgeon: Anders Wilson MD;  Location: [facility] ENDO;  Service: Endoscopy;  Laterality: N/A;    CORONARY ANGIOGRAPHY Right 8/15/2023    Procedure: ANGIOGRAM, CORONARY ARTERY;  Surgeon: Tarik Ng MD;  Location: [facility] CATH LAB;  Service: Cardiology;  Laterality: Right;    ESOPHAGOGASTRODUODENOSCOPY      ESOPHAGOGASTRODUODENOSCOPY N/A 2022    Procedure: EGD (ESOPHAGOGASTRODUODENOSCOPY);  Surgeon: Anders Wilson MD;  Location: [facility] ENDO;  Service: Endoscopy;  Laterality: N/A;    FOOT SURGERY Left     screws pins in foot.    gastric sleeve       Social History     Socioeconomic History    Marital status: Single   Occupational History    Occupation: data input for [employer]     Employer: [employer]   Tobacco Use    Smoking status: Former     Current packs/day: 1.00     Average packs/day: 1 pack/day for 40.0 years (40.0 ttl pk-yrs)     Types: Cigarettes    Smokeless tobacco: Former     Quit date: 3/16/2016   Substance and Sexual Activity    Alcohol use: Yes     Alcohol/week: 1.0 standard drink of alcohol     Types: 1 Glasses of wine per week     Comment: occasionally    Drug use: No    Sexual activity: Never     Partners: Male     Birth control/protection: None     Social Determinants of Health     Financial Resource Strain: Medium Risk (2023)    Overall Financial Resource Strain (CARDIA)     Difficulty of Paying Living Expenses: Somewhat hard   Food Insecurity: No Food Insecurity  (6/22/2023)    Hunger Vital Sign     Worried About Running Out of Food in the Last Year: Never true     Ran Out of Food in the Last Year: Never true   Transportation Needs: No Transportation Needs (6/22/2023)    PRAPARE - Transportation     Lack of Transportation (Medical): No     Lack of Transportation (Non-Medical): No   Physical Activity: Insufficiently Active (6/22/2023)    Exercise Vital Sign     Days of Exercise per Week: 2 days     Minutes of Exercise per Session: 20 min   Stress: Stress Concern Present (6/22/2023)    [place] [employer] - Occupational Stress Questionnaire     Feeling of Stress : Very much   Social Connections: Unknown (6/22/2023)    Social Connection and Isolation Panel [NHANES]     Frequency of Communication with Friends and Family: More than three times a week     Frequency of Social Gatherings with Friends and Family: More than three times a week     Active Member of Clubs or Organizations: Yes     Attends Club or Organization Meetings: 1 to 4 times per year     Marital Status:    Housing Stability: Low Risk  (6/22/2023)    Housing Stability Vital Sign     Unable to Pay for Housing in the Last Year: No     Number of Places Lived in the Last Year: 2     Unstable Housing in the Last Year: No     Family History   Problem Relation Age of Onset    Diabetes Mother     Heart disease Mother     Dementia Mother     Heart attack Father     Cancer Father         rectal    Hypertension Father     Heart disease Father     Diabetes Father     Kidney disease Brother         Allergies  Review of patient's allergies indicates:  No Known Allergies    Review of Systems   Review of Systems   Constitutional: Negative for fever.   HENT:  Negative for nosebleeds.    Eyes:  Negative for visual disturbance.   Cardiovascular:  Positive for chest pain and dyspnea on exertion. Negative for claudication, palpitations and syncope.   Respiratory:  Negative for cough, hemoptysis and  wheezing.    Endocrine: Negative for cold intolerance, heat intolerance, polyphagia and polyuria.   Hematologic/Lymphatic: Negative for bleeding problem.   Skin:  Negative for rash.   Musculoskeletal:  Negative for myalgias.   Gastrointestinal:  Negative for hematemesis, hematochezia, nausea and vomiting.   Genitourinary:  Negative for dysuria.   Neurological:  Negative for focal weakness and sensory change.   Psychiatric/Behavioral:  Negative for altered mental status.        Physical Exam  There were no vitals filed for this visit.    Wt Readings from Last 1 Encounters:   11/29/23 105.2 kg (231 lb 14.8 oz)     Physical Exam  Constitutional:       General: She is not in acute distress.  HENT:      Head: Normocephalic and atraumatic.      Mouth/Throat:      Mouth: Mucous membranes are moist.   Eyes:      Extraocular Movements: Extraocular movements intact.      Pupils: Pupils are equal, round, and reactive to light.   Neck:      Vascular: No carotid bruit or JVD.   Cardiovascular:      Rate and Rhythm: Normal rate and regular rhythm.      Heart sounds: No murmur heard.     No friction rub. No gallop.   Pulmonary:      Effort: Pulmonary effort is normal.      Breath sounds: Normal breath sounds.   Abdominal:      Tenderness: There is no abdominal tenderness. There is no guarding or rebound.   Musculoskeletal:      Right lower leg: Edema present.      Left lower leg: Edema present.      Comments: Trace bilateral lower extremity edema   Skin:     General: Skin is warm and dry.      Capillary Refill: Capillary refill takes less than 2 seconds.   Neurological:      General: No focal deficit present.      Mental Status: She is alert.   Psychiatric:         Mood and Affect: Mood normal.         Labs  Clinical Support on 10/03/2023   Component Date Value Ref Range Status    Interpretation 10/03/2023    Final                    Value:Spirometry is normal. Spirometry remains unimproved following bronchodilator. Lung volume  determination is normal. DLCO is normal.   Â   Notes: The failure to demonstrate improvement in spirometry does not preclude a clinical response to a trial of bronchodilators. DLCO interpretation is based upon the reported hemoglobin level.      Post FVC 10/03/2023 3.21  2.39 - 3.98 L Final    Post FEV1 10/03/2023 2.44  1.87 - 3.12 L Final    Post FEV1 FVC 10/03/2023 76.07  66.56 - 91.21 % Final    Post FEF 25 75 10/03/2023 2.03  1.10 - 3.30 L/s Final    Post PEF 10/03/2023 5.99  4.57 - 8.09 L/s Final    Post  10/03/2023 6.61  sec Final    Pre DLCO 10/03/2023 21.01  17.32 - 28.78 ml/(min*mmHg) Final    DLCO ADJ PRE 10/03/2023 21.08  17.32 - 28.78 ml/(min*mmHg) Final    DLCOVA PRE 10/03/2023 4.84  3.09 - 5.95 ml/(min*mmHg*L) Final    DLVAAdj PRE 10/03/2023 4.86  3.09 - 5.95 ml/(min*mmHg*L) Final    VA PRE 10/03/2023 4.34 (L)  4.95 - 4.95 L Final    IVC PRE 10/03/2023 2.76  2.39 - 3.98 L Final    Pre TLC 10/03/2023 4.97  4.11 - 6.09 L Final    VC PRE 10/03/2023 3.06  2.39 - 3.98 L Final    Pre FRC PL 10/03/2023 2.35  L Final    Pre ERV 10/03/2023 0.26  -61546.22 - 62222.78 L Final    Pre RV 10/03/2023 1.91  1.40 - 2.55 L Final    RVTLC PRE 10/03/2023 38.51  30.45 - 49.63 % Final    Raw PRE 10/03/2023 5.09 (H)  3.06 - 3.06 cmH2O*s/L Final    VTGRAWPRE 10/03/2023 3.22  L Final    Pre FVC 10/03/2023 3.06  2.39 - 3.98 L Final    Pre FEV1 10/03/2023 2.27  1.87 - 3.12 L Final    Pre FEV1 FVC 10/03/2023 74.14  66.56 - 91.21 % Final    Pre FEF 25 75 10/03/2023 1.74  1.10 - 3.30 L/s Final    Pre PEF 10/03/2023 4.93  4.57 - 8.09 L/s Final    Pre  10/03/2023 6.98  sec Final    FVC Ref 10/03/2023 3.19   Final    FVC LLN 10/03/2023 2.39   Final    FVC Pre Ref 10/03/2023 95.9  % Final    FVC Post Ref 10/03/2023 100.7  % Final    FVC Chg 10/03/2023 5.0  % Final    FEV1 Ref 10/03/2023 2.50   Final    FEV1 LLN 10/03/2023 1.87   Final    FEV1 Pre Ref 10/03/2023 90.8  % Final    FEV1 Post Ref 10/03/2023 97.8  % Final     FEV1 Chg 10/03/2023 7.8  % Final    FEV1 FVC Ref 10/03/2023 79   Final    FEV1 FVC LLN 10/03/2023 67   Final    FEV1 FVC Pre Ref 10/03/2023 94.0  % Final    FEV1 FVC Post Ref 10/03/2023 96.4  % Final    FEV1 FVC Chg 10/03/2023 2.6  % Final    FEF 25 75 Ref 10/03/2023 2.20   Final    FEF 25 75 LLN 10/03/2023 1.10   Final    FEF 25 75 Pre Ref 10/03/2023 79.0  % Final    FEF 25 75 Post Ref 10/03/2023 92.3  % Final    FEF 25 75 Chg 10/03/2023 16.8  % Final    PEF Ref 10/03/2023 6.33   Final    PEF LLN 10/03/2023 4.57   Final    PEF Pre Ref 10/03/2023 77.9  % Final    PEF Post Ref 10/03/2023 94.7  % Final    PEF Chg 10/03/2023 21.6  % Final    NUZ812 Chg 10/03/2023 -5.3  % Final    TLC Ref 10/03/2023 5.10   Final    TLC LLN 10/03/2023 4.11   Final    TLC Pre Ref 10/03/2023 97.4  % Final    VC Ref 10/03/2023 3.19   Final    VC LLN 10/03/2023 2.39   Final    VC Pre Ref 10/03/2023 95.9  % Final    FRCpleth Ref 10/03/2023 2.76   Final    FRCpleth LLN 10/03/2023 1.94   Final    FRCpleth PreRef 10/03/2023 85.3  % Final    ERV Ref 10/03/2023 0.78   Final    ERV LLN 10/03/2023 -02418.22   Final    ERV Pre Ref 10/03/2023 33.2  % Final    RV Ref 10/03/2023 1.98   Final    RV LLN 10/03/2023 1.40   Final    RV Pre Ref 10/03/2023 96.7  % Final    RVTLC Ref 10/03/2023 40   Final    RVTLC LLN 10/03/2023 30   Final    RVTLC Pre Ref 10/03/2023 96.2  % Final    Raw Ref 10/03/2023 3.06   Final    Raw LLN 10/03/2023 3.06   Final    Raw Pre Ref 10/03/2023 166.4  % Final    DLCO Single Breath Ref 10/03/2023 23.05   Final    DLCO Single Breath LLN 10/03/2023 17.32   Final    DLCO Single Breath Pre Ref 10/03/2023 91.2  % Final    DLCOc Single Breath Ref 10/03/2023 23.05   Final    DLCOc Single Breath LLN 10/03/2023 17.32   Final    DLCOc Single Breath Pre Ref 10/03/2023 91.5  % Final    DLCOVA Ref 10/03/2023 4.52   Final    DLCOVA LLN 10/03/2023 3.09   Final    DLCOVA Pre Ref 10/03/2023 107.1  % Final    DLCOc SBVA Ref 10/03/2023 4.52   Final     DLCOc SBVA LLN 10/03/2023 3.09   Final    DLCOc SBVA Pre Ref 10/03/2023 107.4  % Final    VA Single Breath Ref 10/03/2023 4.95   Final    VA Single Breath LLN 10/03/2023 4.95   Final    VA Single Breath Pre Ref 10/03/2023 87.7  % Final    IVC Single Breath Ref 10/03/2023 3.19   Final    IVC Single Breath LLN 10/03/2023 2.39   Final    IVC Single Breath Pre Ref 10/03/2023 86.6  % Final   Lab Visit on 08/11/2023   Component Date Value Ref Range Status    WBC 08/11/2023 8.66  3.90 - 12.70 K/uL Final    RBC 08/11/2023 4.54  4.00 - 5.40 M/uL Final    Hemoglobin 08/11/2023 13.3  12.0 - 16.0 g/dL Final    Hematocrit 08/11/2023 40.7  37.0 - 48.5 % Final    MCV 08/11/2023 90  82 - 98 fL Final    MCH 08/11/2023 29.3  27.0 - 31.0 pg Final    MCHC 08/11/2023 32.7  32.0 - 36.0 g/dL Final    RDW 08/11/2023 12.7  11.5 - 14.5 % Final    Platelets 08/11/2023 286  150 - 450 K/uL Final    MPV 08/11/2023 10.0  9.2 - 12.9 fL Final    Immature Granulocytes 08/11/2023 0.1  0.0 - 0.5 % Final    Gran # (ANC) 08/11/2023 5.3  1.8 - 7.7 K/uL Final    Immature Grans (Abs) 08/11/2023 0.01  0.00 - 0.04 K/uL Final    Comment: Mild elevation in immature granulocytes is non specific and   can be seen in a variety of conditions including stress response,   acute inflammation, trauma and pregnancy. Correlation with other   laboratory and clinical findings is essential.      Lymph # 08/11/2023 2.4  1.0 - 4.8 K/uL Final    Mono # 08/11/2023 0.6  0.3 - 1.0 K/uL Final    Eos # 08/11/2023 0.3  0.0 - 0.5 K/uL Final    Baso # 08/11/2023 0.04  0.00 - 0.20 K/uL Final    nRBC 08/11/2023 0  0 /100 WBC Final    Gran % 08/11/2023 61.3  38.0 - 73.0 % Final    Lymph % 08/11/2023 27.7  18.0 - 48.0 % Final    Mono % 08/11/2023 6.8  4.0 - 15.0 % Final    Eosinophil % 08/11/2023 3.6  0.0 - 8.0 % Final    Basophil % 08/11/2023 0.5  0.0 - 1.9 % Final    Differential Method 08/11/2023 Automated   Final    Sodium 08/11/2023 141  136 - 145 mmol/L Final    Potassium  08/11/2023 4.0  3.5 - 5.1 mmol/L Final    Chloride 08/11/2023 107  95 - 110 mmol/L Final    CO2 08/11/2023 23  23 - 29 mmol/L Final    Glucose 08/11/2023 84  70 - 110 mg/dL Final    BUN 08/11/2023 18  8 - 23 mg/dL Final    Creatinine 08/11/2023 1.0  0.5 - 1.4 mg/dL Final    Calcium 08/11/2023 9.3  8.7 - 10.5 mg/dL Final    Anion Gap 08/11/2023 11  8 - 16 mmol/L Final    eGFR 08/11/2023 >60.0  >60 mL/min/1.73 m^2 Final    Prothrombin Time 08/11/2023 10.3  9.0 - 12.5 sec Final    INR 08/11/2023 1.0  0.8 - 1.2 Final    Comment: Coumadin Therapy:  2.0 - 3.0 for INR for all indicators except mechanical heart valves  and antiphospholipid syndromes which should use 2.5 - 3.5.  LOT^040^PT Inn^[id number]      Magnesium 08/11/2023 2.0  1.6 - 2.6 mg/dL Final   Hospital Outpatient Visit on 07/12/2023   Component Date Value Ref Range Status    BSA 07/12/2023 2.15  m2 Final    TDI SEPTAL 07/12/2023 0.08  m/s Final    LV LATERAL E/E' RATIO 07/12/2023 8.90  m/s Final    LV SEPTAL E/E' RATIO 07/12/2023 11.13  m/s Final    IVC diameter 07/12/2023 1.88  cm Final    Left Ventricular Outflow Tract Beba* 07/12/2023 0.64  cm/s Final    Left Ventricular Outflow Tract Beba* 07/12/2023 1.96  mmHg Final    AORTIC VALVE CUSP SEPERATION 07/12/2023 1.75  cm Final    TDI LATERAL 07/12/2023 0.10  m/s Final    PV PEAK VELOCITY 07/12/2023 1.41  cm/s Final    LVIDd 07/12/2023 4.50  3.5 - 6.0 cm Final    IVS 07/12/2023 1.10  0.6 - 1.1 cm Final    Posterior Wall 07/12/2023 1.10 (A)  0.6 - 1.1 cm Final    Ao root annulus 07/12/2023 2.14  cm Final    LVIDs 07/12/2023 3.47  2.1 - 4.0 cm Final    FS 07/12/2023 23  28 - 44 % Final    Sinus 07/12/2023 2.31  cm Final    STJ 07/12/2023 2.33  cm Final    Ascending aorta 07/12/2023 2.24  cm Final    LV mass 07/12/2023 175.02  g Final    LA size 07/12/2023 3.38  cm Final    RVDD 07/12/2023 2.13  cm Final    Left Ventricle Relative Wall Thick* 07/12/2023 0.49  cm Final    AV mean gradient 07/12/2023 5  mmHg Final    AV  valve area 07/12/2023 1.54  cm2 Final    AV Velocity Ratio 07/12/2023 0.63   Final    AV index (prosthetic) 07/12/2023 0.68   Final    MV mean gradient 07/12/2023 2  mmHg Final    MV valve area p 1/2 method 07/12/2023 2.61  cm2 Final    MV valve area by continuity eq 07/12/2023 1.35  cm2 Final    E/A ratio 07/12/2023 0.99   Final    Mean e' 07/12/2023 0.09  m/s Final    E wave deceleration time 07/12/2023 247.41  msec Final    LVOT diameter 07/12/2023 1.70  cm Final    LVOT area 07/12/2023 2.3  cm2 Final    LVOT peak noel 07/12/2023 0.95  m/s Final    LVOT peak VTI 07/12/2023 25.30  cm Final    Ao peak noel 07/12/2023 1.51  m/s Final    Ao VTI 07/12/2023 37.3  cm Final    Mr max noel 07/12/2023 5.26  m/s Final    LVOT stroke volume 07/12/2023 57.40  cm3 Final    AV peak gradient 07/12/2023 9  mmHg Final    MV peak gradient 07/12/2023 5  mmHg Final    E/E' ratio 07/12/2023 9.89  m/s Final    MV Peak E Noel 07/12/2023 0.89  m/s Final    TR Max Noel 07/12/2023 2.83  m/s Final    MV VTI 07/12/2023 42.6  cm Final    MV stenosis pressure 1/2 time 07/12/2023 84.29  ms Final    MV Peak A Noel 07/12/2023 0.90  m/s Final    LV Systolic Volume 07/12/2023 49.90  mL Final    LV Systolic Volume Index 07/12/2023 24.1  mL/m2 Final    LV Diastolic Volume 07/12/2023 118.87  mL Final    LV Diastolic Volume Index 07/12/2023 57.43  mL/m2 Final    LV Mass Index 07/12/2023 85  g/m2 Final    RA Major Prentiss 07/12/2023 4.19  cm Final    Left Atrium Minor Axis 07/12/2023 3.22  cm Final    Left Atrium Major Axis 07/12/2023 5.11  cm Final    Triscuspid Valve Regurgitation Pea* 07/12/2023 32  mmHg Final    LA Volume Index (Mod) 07/12/2023 30.0  mL/m2 Final    LA volume (mod) 07/12/2023 62.00  cm3 Final    RA Width 07/12/2023 2.89  cm Final    Right Atrial Pressure (from IVC) 07/12/2023 8  mmHg Final    EF 07/12/2023 50  % Final    TV resting pulmonary artery pressu* 07/12/2023 40  mmHg Final   Hospital Outpatient Visit on 06/19/2023   Component Date  Value Ref Range Status    85% Max Predicted HR 06/19/2023 129   Final    Max Predicted HR 06/19/2023 151   Final    [facility] CV CPX PATIENT IS MALE 06/19/2023 0.0   Final    [facility] CV CPX PATIENT IS FEMALE 06/19/2023 1.0   Final    Systolic blood pressure 06/19/2023 145  mmHg Final    Diastolic blood pressure 06/19/2023 69  mmHg Final    HR at rest 06/19/2023 44  bpm Final    Peak Systolic BP 06/19/2023 145  mmHg Final    Peak Diatolic BP 06/19/2023 69  mmHg Final    Peak HR 06/19/2023 87  bpm Final    % Max HR Achieved 06/19/2023 57   Final    RPP 06/19/2023 6,380   Final    Peak RPP 06/19/2023 12,615   Final    dose 06/19/2023 0.4  mg Final       EKG  EKG 05/2023 sinus bradycardia left bundle-branch block    Echo   Results for orders placed or performed during the hospital encounter of 07/12/23   Echo   Result Value Ref Range    BSA 2.15 m2    TDI SEPTAL 0.08 m/s    LV LATERAL E/E' RATIO 8.90 m/s    LV SEPTAL E/E' RATIO 11.13 m/s    IVC diameter 1.88 cm    Left Ventricular Outflow Tract Mean Velocity 0.64 cm/s    Left Ventricular Outflow Tract Mean Gradient 1.96 mmHg    AORTIC VALVE CUSP SEPERATION 1.75 cm    TDI LATERAL 0.10 m/s    PV PEAK VELOCITY 1.41 cm/s    LVIDd 4.50 3.5 - 6.0 cm    IVS 1.10 0.6 - 1.1 cm    Posterior Wall 1.10 (A) 0.6 - 1.1 cm    Ao root annulus 2.14 cm    LVIDs 3.47 2.1 - 4.0 cm    FS 23 28 - 44 %    Sinus 2.31 cm    STJ 2.33 cm    Ascending aorta 2.24 cm    LV mass 175.02 g    LA size 3.38 cm    RVDD 2.13 cm    Left Ventricle Relative Wall Thickness 0.49 cm    AV mean gradient 5 mmHg    AV valve area 1.54 cm2    AV Velocity Ratio 0.63     AV index (prosthetic) 0.68     MV mean gradient 2 mmHg    MV valve area p 1/2 method 2.61 cm2    MV valve area by continuity eq 1.35 cm2    E/A ratio 0.99     Mean e' 0.09 m/s    E wave deceleration time 247.41 msec    LVOT diameter 1.70 cm    LVOT area 2.3 cm2    LVOT peak rick 0.95 m/s    LVOT peak VTI 25.30 cm    Ao peak rick 1.51 m/s    Ao VTI 37.3 cm    Mr max  noel 5.26 m/s    LVOT stroke volume 57.40 cm3    AV peak gradient 9 mmHg    MV peak gradient 5 mmHg    E/E' ratio 9.89 m/s    MV Peak E Noel 0.89 m/s    TR Max Noel 2.83 m/s    MV VTI 42.6 cm    MV stenosis pressure 1/2 time 84.29 ms    MV Peak A Noel 0.90 m/s    LV Systolic Volume 49.90 mL    LV Systolic Volume Index 24.1 mL/m2    LV Diastolic Volume 118.87 mL    LV Diastolic Volume Index 57.43 mL/m2    LV Mass Index 85 g/m2    RA Major Axis 4.19 cm    Left Atrium Minor Axis 3.22 cm    Left Atrium Major Axis 5.11 cm    Triscuspid Valve Regurgitation Peak Gradient 32 mmHg    LA Volume Index (Mod) 30.0 mL/m2    LA volume (mod) 62.00 cm3    RA Width 2.89 cm    Right Atrial Pressure (from IVC) 8 mmHg    EF 50 %    TV resting pulmonary artery pressure 40 mmHg    Narrative    · The left ventricle is normal in size with mild concentric hypertrophy.  · The estimated ejection fraction is 45-50%.  · Indeterminate left ventricular diastolic function.  · Mild mitral regurgitation.  · Normal right ventricular size with normal right ventricular systolic   function.  · Mild tricuspid regurgitation.  · The estimated PA systolic pressure is 40 mmHg.  · Intermediate central venous pressure (8 mmHg).          Imaging  Nuclear Stress Test    Result Date: 6/19/2023    The ECG portion of the study is negative for ischemia although technically uninterpretable due to LBBB on resting ECG.   The patient reported no chest pain during the stress test.   There were no arrhythmias during stress.   The nuclear portion of this study will be reported separately.     NM Myocardial Perfusion Spect Multi Pharmacologic    Result Date: 6/19/2023  EXAMINATION: NM MYOCARDIAL PERFUSION SPECT MULTI PHARM CLINICAL HISTORY: Chest pain/anginal equiv, intermediate CAD risk, not treadmill candidate;  Bradycardia, unspecified TECHNIQUE: SPECT-CT images through the heart were acquired at rest following the intravenous administration of 10.1 mCi Tc-99m tetrofosmin.  SPECT-CT images were then acquired during a Lexiscan cardiac stress following the intravenous administration of 33 mCi Tc-99m tetrofosmin. The clinical stress and ECG portion of the study will be interpreted separately. COMPARISON: None available FINDINGS: Focal hypokinesis in the anterior wall near the apex.  This corresponds to a small anterior wall perfusion defect near the apex which is slightly larger/more conspicuous on stress images.  Finding is present on both the attenuation corrected and non corrected images. Stress and rest end diastolic volumes 60 and 43 mL, respectively. Stress and rest end systolic volumes 23 and 19 mL, respectively. Estimated left ventricular ejection fraction 61% during stress and 56% during rest. TID: 1.06 Limited noncontrast CT demonstrates a small hiatal hernia and postop changes of prior gastric sleeve.     1. Suspect a small area reversible ischemia in the anterior wall near the apex with associated focal hypokinesis. 2. A small underlying fixed defect may indicate underlying infarct versus apical thinning. 3. Left ventricular ejection fraction 61% during stress and 56% during rest. This report was flagged in Epic as abnormal. Electronically signed by: Keegan Phoenix Date:    06/19/2023 Time:    16:44      Prior coronary angiogram / intervention:  N/A    Assessment and Plan  1. Coronary artery disease  Mild nonobstructive on angiogram  False-positive stress test  Continue aspirin statin    2. Dyspnea on exertion   Followed by pulmonology  Significant smoking history  PASP 40 (borderline pHTN) likely WHO III; echocardiographic surveillance  Euvolemic    4. LBBB (left bundle branch block)  Noted    5. Mixed hyperlipidemia  Continue statin    6. Sinus bradycardia  Holter      7.  Blood pressure log call if systolic greater than 140 at night    Follow Up  6 months    Total professional time spent for the encounter: 30 minutes  Time was spent preparing to see the patient, reviewing  results of prior testing, obtaining and/or reviewing separately obtained history, performing a medically appropriate examination and interview, counseling and educating the patient/family, ordering medications/tests/procedures, referring and communicating with other health care professionals, documenting clinical information in the electronic health record, and independently interpreting results.      Tarik Ng MD, FACC, RPVI  Interventional Cardiology

## 2023-12-11 ENCOUNTER — TELEPHONE (OUTPATIENT)
Dept: CARDIOLOGY | Facility: CLINIC | Age: 63
End: 2023-12-11
Payer: COMMERCIAL

## 2023-12-12 NOTE — TELEPHONE ENCOUNTER
"Message left on voicemail:  Holter monitor shows heart rate is in a good range with rare extra beats (PAC's)which do not require treatment. "Good report"  "

## 2023-12-13 ENCOUNTER — OFFICE VISIT (OUTPATIENT)
Dept: PODIATRY | Facility: CLINIC | Age: 63
End: 2023-12-13
Payer: COMMERCIAL

## 2023-12-13 VITALS
BODY MASS INDEX: 38.6 KG/M2 | DIASTOLIC BLOOD PRESSURE: 65 MMHG | HEIGHT: 65 IN | WEIGHT: 231.69 LBS | SYSTOLIC BLOOD PRESSURE: 130 MMHG

## 2023-12-13 DIAGNOSIS — M79.672 ARCH PAIN OF LEFT FOOT: Primary | ICD-10-CM

## 2023-12-13 DIAGNOSIS — G57.92 PERIPHERAL NEURITIS OF LEFT FOOT: ICD-10-CM

## 2023-12-13 DIAGNOSIS — M12.572 TRAUMATIC ARTHROPATHY OF LEFT FOOT: ICD-10-CM

## 2023-12-13 DIAGNOSIS — T84.9XXS: ICD-10-CM

## 2023-12-13 PROCEDURE — 3075F PR MOST RECENT SYSTOLIC BLOOD PRESS GE 130-139MM HG: ICD-10-PCS | Mod: CPTII,S$GLB,, | Performed by: PODIATRIST

## 2023-12-13 PROCEDURE — 3008F PR BODY MASS INDEX (BMI) DOCUMENTED: ICD-10-PCS | Mod: CPTII,S$GLB,, | Performed by: PODIATRIST

## 2023-12-13 PROCEDURE — 3044F PR MOST RECENT HEMOGLOBIN A1C LEVEL <7.0%: ICD-10-PCS | Mod: CPTII,S$GLB,, | Performed by: PODIATRIST

## 2023-12-13 PROCEDURE — 3078F DIAST BP <80 MM HG: CPT | Mod: CPTII,S$GLB,, | Performed by: PODIATRIST

## 2023-12-13 PROCEDURE — 99999 PR PBB SHADOW E&M-EST. PATIENT-LVL III: ICD-10-PCS | Mod: PBBFAC,,, | Performed by: PODIATRIST

## 2023-12-13 PROCEDURE — 1159F MED LIST DOCD IN RCRD: CPT | Mod: CPTII,S$GLB,, | Performed by: PODIATRIST

## 2023-12-13 PROCEDURE — 3075F SYST BP GE 130 - 139MM HG: CPT | Mod: CPTII,S$GLB,, | Performed by: PODIATRIST

## 2023-12-13 PROCEDURE — 3078F PR MOST RECENT DIASTOLIC BLOOD PRESSURE < 80 MM HG: ICD-10-PCS | Mod: CPTII,S$GLB,, | Performed by: PODIATRIST

## 2023-12-13 PROCEDURE — 1159F PR MEDICATION LIST DOCUMENTED IN MEDICAL RECORD: ICD-10-PCS | Mod: CPTII,S$GLB,, | Performed by: PODIATRIST

## 2023-12-13 PROCEDURE — 3044F HG A1C LEVEL LT 7.0%: CPT | Mod: CPTII,S$GLB,, | Performed by: PODIATRIST

## 2023-12-13 PROCEDURE — 3008F BODY MASS INDEX DOCD: CPT | Mod: CPTII,S$GLB,, | Performed by: PODIATRIST

## 2023-12-13 PROCEDURE — 99214 OFFICE O/P EST MOD 30 MIN: CPT | Mod: S$GLB,,, | Performed by: PODIATRIST

## 2023-12-13 PROCEDURE — 99214 PR OFFICE/OUTPT VISIT, EST, LEVL IV, 30-39 MIN: ICD-10-PCS | Mod: S$GLB,,, | Performed by: PODIATRIST

## 2023-12-13 PROCEDURE — 99999 PR PBB SHADOW E&M-EST. PATIENT-LVL III: CPT | Mod: PBBFAC,,, | Performed by: PODIATRIST

## 2023-12-13 NOTE — PROGRESS NOTES
Subjective:      Patient ID: Destiny Carson is a 63 y.o. female.    Chief Complaint: Follow-up    Patient is here for f/u L arch pain plantar & dorsal. PPT arch pads were added to [email or web address] archfit inserts & dispensed gelstrap as alternative for use in slipper (too thick otherwise on dorsal midfoot). Bought new pair of [employer]ogo tennis shoes. States has residual pain; pain level 2/10.   11/13/23  Patient is here for 3 wk.f/u L midfoot pain as well as associated neuritis L. States pads too thick in shoes & arches rubbed laterally so limited in shoes & modification thus far. Using gelstrap in slippers @ home. Has pair of Skechers arch fit that is used mainly for work; better dorsal midfoot as has stretch top instead of laces, but still get soreness by end of day.  9/28/23  Destiny is a 63 y.o. female who presents new to the podiatry clinic  w/ c/o L foot pain & numbness. Onset of the symptoms was  Nov.2 yrs.ago when fell & broke her foot. Had surgery @ [facility] Dec.2021. Since then, apparently has feeling of a loose staple & foot stays swollen - shoe pressur w/ shoe laces hurt foot. Also, pain w/ ambulation over uneven surfaces.  Pain level 6/10 @ worst.     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     Lives in [place] 6 months but from [place] so [place] lot of [place] in [place].    Shoe gear: Skechers arch fit athletic shoes (for work); flat slippers in house.    PCP Randell Huynh MD 10/26/23    Past Medical History:   Diagnosis Date    Coronary artery disease involving native coronary artery of native heart without angina pectoris 8/29/2023    Hyperlipidemia     Syncope and collapse     Thyroid disease      Patient Active Problem List   Diagnosis    Pes planus (flat feet)    Plantar fasciitis    S/P gastric surgery    Elevated LDL cholesterol level    Vitamin D deficiency    S/P colonoscopy with polypectomy    Epigastric pain    Screen for colon cancer    LBBB (left bundle  branch block)    Dyspnea on exertion    Sinus bradycardia    Acquired hypothyroidism    Mixed hyperlipidemia    Cholelithiasis    Abdominal aortic atherosclerosis    HLD (hyperlipidemia)    Abnormal cardiovascular stress test    Coronary artery disease involving native coronary artery of native heart without angina pectoris    Pulmonary HTN      Objective:       Contains abnormal data X-Ray Foot Complete Left  Order: [id number]  Status: Final result       Visible to patient: Yes (not seen)       Next appt: 01/24/2024 at 12:45 PM in Family Medicine (Randell Huynh MD)       Dx: Pain in left ankle and joints of left...    0 Result Notes  Details  Reading Physician Reading Date Result Priority   Keegan Reeves MD  [phone] 11/14/2023 Routine     Narrative & Impression  EXAMINATION:  XR FOOT COMPLETE 3 VIEW LEFT     CLINICAL HISTORY:  .  Pain in left ankle and joints of left foot     TECHNIQUE:  AP, lateral and oblique views of the left foot were performed.     COMPARISON:  11/27/2021     FINDINGS:  Postsurgical change prior instrumented fusion the level of the midfoot and involving the 1st through 3rd tarsometatarsal joints as well as the distal metatarsal row.  There is apparent discontinuity of the fusion devices spanning the 1st and 3rd tarsometatarsal joints.  There is also some surrounding lucency evident on the lateral radiograph keeping with mechanical loosening..     There is no evidence of an acute displaced fracture.  Alignment appears within normal limits.     Modest scattered degenerative change.  Mild calcaneal enthesopathy.  No focal soft tissue swelling identified.     Impression:     Postsurgical change with evidence of hardware failure.  Clinical correlation advised with cross-sectional imaging for further workup if warranted.     This report was flagged in Epic as abnormal.     Electronically signed by: Keegan Reeves MD  Date:                                            11/14/2023  Time:                                            08:00     I independently reviewed the x-ray images.  As expected, failure of hardware (2 staples) as well as prominence dorsal tarsal-met.jt., consistent w/ sx & motion across the 1st & 2nd met.cuneiform jt.    X-Ray Foot Complete Left  Order: [id number]  Impression    Postoperative changes of prior Lisfranc fixation without evidence of hardware complication.     Preliminary Report Dictated By: STEPHY REGAN DO     Electronically Signed By: Anders Cui MD 6/21/2022 11:02 AM CDT  Narrative    [facility] XR FOOT 3+ VW LEFT     ICD10: S93.326A   Traumatic dislocation of tarsometatarsal (TMT) joint   REASON FOR STUDY: .   PROVIDER COMMENTS:     COMPARISON: Radiograph of the left foot 3/15/2022.     FINDINGS:     Fixation hardware in the setting of prior Lisfranc injury repair is again seen. The hardware is unchanged in position without evidence of focal surrounding lucency. Bony demineralization most prominent at the base of the 1st metatarsal. Joint alignment is unchanged. No acute focal soft tissue abnormality.  Exam End: 06/21/22 08:22    Specimen Collected: 06/21/22 08:23 Last Resulted: 06/21/22 11:02   Received From: [facility]  Result Received: 08/29/23 12:54     X-Ray Foot Complete Left  Order: [id number]  Status: Edited Result - FINAL     Visible to patient: Yes (not seen)     Next appt: 10/26/2023 at 12:45 PM in Family Medicine (Randell Huynh MD)     0 Result Notes  Details  Reading Physician Reading Date Result Priority   Danie Luis MD  [phone] 11/27/2021 STAT     Addenda     There is undulation at the base of the 1st metatarsal, no definite acute displaced fracture or dislocation.  Correlation however is advised as lucency appears to be vertically oriented on the lateral view.  CT could be obtained for definitive exclusion of fracture as warranted.        Electronically signed by: Danie Luis MD  Date:                                             11/27/2021  Time:                                           16:46  Signed by Danie Luis MD on 11/27/2021 16:48  Narrative & Impression  EXAMINATION:  XR FOOT COMPLETE 3 VIEW LEFT     CLINICAL HISTORY:  .  Pain, unspecified     TECHNIQUE:  AP, lateral and oblique views of the left foot were performed.     COMPARISON:  None     FINDINGS:  Three views left foot.     No acute displaced fracture or dislocation of the foot.  No radiopaque foreign body.  No significant edema.     Impression:     1. No acute displaced fracture or dislocation of the foot.     Electronically signed by: Danie Luis MD  Date:                                            11/27/2021  Time:                                           15:23   I independently reviewed the Xray images. Enlarged navicular L.    Review of Systems   Constitutional: Negative for malaise/fatigue.   Skin:  Negative for dry skin and poor wound healing.   Musculoskeletal:  Positive for falls (see hpi), joint pain and myalgias. Negative for muscle weakness.   Neurological:  Positive for numbness. Negative for focal weakness, paresthesias and weakness.   Psychiatric/Behavioral:  The patient is not nervous/anxious.      Physical Exam  Vitals reviewed.   Constitutional:       Appearance: She is well-developed. She is morbidly obese.   Cardiovascular:      Pulses: Normal pulses.           Dorsalis pedis pulses are 2+ on the right side and 2+ on the left side.   Musculoskeletal:         General: Swelling, tenderness and signs of injury (remote (2 yrs.ago Nov; ORIF Dec.2021) L midfoot) present.      Right lower leg: No edema.      Left lower leg: No edema.      Right foot: Deformity present.      Left foot: Deformity (pes planus B/L L>R but slight MLA contour NWB) present.        Feet:    Feet:      Comments: Equinus B/L ankles w/ < 90 deg foot to leg noted w/ knees extended.      MS strength of extrinsics to foot & ankle B/L + 5/5 in DF/PF/Inv/Ev to resistance w/ no  reproduction of pain in any direction.  Residual TTP & fullness /out calor medial navicular L (?PT tendon insertion), w/ no radiation proximal or distally.   Bony prominence &/ or hardware dorsal medial midfoot L.    Passive ROM of ankle & pedal joints is painless & w/out crepitation B/L.  No hypermobility nor instability noted base met./ dorsal cuneiforms L.     Skin:     General: Skin is warm and dry.      Capillary Refill: Capillary refill takes less than 2 seconds.      Findings: No bruising or erythema.   Neurological:      Mental Status: She is alert and oriented to person, place, and time.      Sensory: No sensory deficit.      Motor: Motor function is intact. No weakness or abnormal muscle tone.      Gait: Gait is intact. Gait normal.      Comments: Paresthesias including numbness & 'stinging' L dorsomedial midfoot w/ TTP & fullness met.cuneiform joint dorsally, along MDCN. No radiation proximally nor distally.   Psychiatric:         Mood and Affect: Mood and affect normal.         Speech: Speech is not rapid and pressured.         Behavior: Behavior normal. Behavior is cooperative.         Assessment:      Encounter Diagnoses   Name Primary?    Arch pain of left foot Yes    Traumatic arthropathy of left foot     Peripheral neuritis of left foot     Complication involving orthopedic internal fixation device, sequela        Problem List Items Addressed This Visit    None  Visit Diagnoses       Arch pain of left foot    -  Primary    Traumatic arthropathy of left foot        Peripheral neuritis of left foot        Complication involving orthopedic internal fixation device, sequela                Plan:      I counseled the patient on her conditions, their implications & medical mgmt.    - Shoe inspection. Patient instructed on proper supportive shoe gear @ all times WB.  Added PPT arch pad to new pair of tennis shoes.  May continue PPT arch pads in removable inserts of [employer] archfit shoes.  Continue Silopos  gelstrap as MLA support L in slipper. (unable to tolerate thin gelstrap for dorsal padding).    Continue Voltaren gel up to qid prn.    F/u 4-6 wks.prn - patient will call.          A total of 33 mins.was spent on chart review, patient visit & documentation.

## 2024-01-29 ENCOUNTER — OFFICE VISIT (OUTPATIENT)
Dept: PSYCHIATRY | Facility: CLINIC | Age: 64
End: 2024-01-29
Payer: COMMERCIAL

## 2024-01-29 VITALS
BODY MASS INDEX: 38.51 KG/M2 | DIASTOLIC BLOOD PRESSURE: 65 MMHG | SYSTOLIC BLOOD PRESSURE: 156 MMHG | HEART RATE: 56 BPM | WEIGHT: 231.13 LBS | HEIGHT: 65 IN

## 2024-01-29 DIAGNOSIS — F69 RAGE ATTACKS: ICD-10-CM

## 2024-01-29 DIAGNOSIS — F63.81 INTERMITTENT EXPLOSIVE DISORDER IN ADULT: Primary | ICD-10-CM

## 2024-01-29 PROCEDURE — 1159F MED LIST DOCD IN RCRD: CPT | Mod: CPTII,S$GLB,,

## 2024-01-29 PROCEDURE — 3078F DIAST BP <80 MM HG: CPT | Mod: CPTII,S$GLB,,

## 2024-01-29 PROCEDURE — 99999 PR PBB SHADOW E&M-EST. PATIENT-LVL III: CPT | Mod: PBBFAC,,,

## 2024-01-29 PROCEDURE — 90792 PSYCH DIAG EVAL W/MED SRVCS: CPT | Mod: S$GLB,,,

## 2024-01-29 PROCEDURE — 1160F RVW MEDS BY RX/DR IN RCRD: CPT | Mod: CPTII,S$GLB,,

## 2024-01-29 PROCEDURE — 3077F SYST BP >= 140 MM HG: CPT | Mod: CPTII,S$GLB,,

## 2024-01-29 NOTE — PROGRESS NOTES
"Outpatient Psychiatry Initial Visit (MD/NP)    1/29/2024    Destiny Carson, a 63 y.o. female, presenting for initial evaluation visit. Met with patient face to face.    Reason for Encounter: Randell Huynh MD . Patient complains of verbal aggression. .    History of Present Illness: Patient presents alert, causally dressed and groomed. Reports she has difficulty with verbal aggression. Reports she has always been this way but reports her "outbursts" have become more frequent and she's having a harder time recovering from the episode. 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 Reports her aggression is daily. She denies any physical aggression although she states "at home I throw things", and "I can do more harm with my mouth.". She endorses knowing that her behavior is "unacceptable" but she states she can not control her behavior. Reports she does not like "stupid". Current medications (prescribed by her PCP) include escitalopram  20 mg and trazodone 50 mg. Reports taking her medications as prescribed except she only takes 25 mg of trazodone due to morning drowsiness. She denies S/H/I. She denies A/V/H.     Endorses depression symptoms over the past 2 weeks including decreased interest/motivation/pleasure/energy/appetite/concentration/sleep. Sleep reports interrupted sleep prior to trazodone that was started by her PCP  4 days ago and denies trouble with  initiating sleep prior. She reports she has difficulty with initiating in things of interests and she endorses decreased motivation. She has feelings of guilt including how she "treats her truck drivers", about her "daughter not having a father in her life", and about people she lost in her life. Reports her energy is "non existent". States "Not like I sleep I just don't want to do nothing.". Reports her " "concentration is getting "harder and harder". Reports her appetite is increased. She had a gastric bypass in 2017 and lost 100 pounds and she has gained 50 pounds back.     Endorses PHI symptoms including excess worry, tension, insomnia. Worries and has ruminating thoughts about "custodial and stupid people". "I will never be able to retire." Denies panic attacks.     Denies any history of manic symptoms, including decreased need for sleep, increased energy, increased goal oriented behavior, risky behavior, racing thoughts.     Denies any history of psychotic symptoms, including AVH, paranoia, thought insertion/broadcasting/withdrawal, delusions.       Denies history of PTSD symptoms including flashbacks, nightmares, hypervigilance.     Denies OCD and eating disorder symptoms.     Suicide thoughts: Denies S/H/I currently. Reports having passive thoughts of "What would it be like if I was not here?" Reports she "would never, ever, do it" because of her daughter.     Substance abuse: Denies any illicit substances currently. Endorses recreational substance use 34-35 years ago.    Alcohol use: Occasional not even monthly.     Denies previous psychiatric hospitalization    Head trauma: Deneis  \  Seizures: Denies    Denies access to a gun    PHQ-9=20 severe    PHI-7- 14 moderate anxiety    Review Of Systems:     Review of Systems   Constitutional:  Positive for malaise/fatigue. Negative for chills and fever.   HENT:  Negative for ear discharge, ear pain and sore throat.    Eyes:  Negative for pain, discharge and redness.   Respiratory:  Positive for shortness of breath. Negative for cough.    Cardiovascular:  Positive for palpitations. Negative for chest pain.   Gastrointestinal:  Negative for abdominal pain, constipation, diarrhea, nausea and vomiting.   Genitourinary:  Negative for dysuria.   Musculoskeletal:  Positive for back pain and joint pain. Negative for myalgias and neck pain.   Skin: Negative.  Negative for " "rash.   Neurological:  Positive for sensory change and headaches. Negative for dizziness, seizures and weakness.   Endo/Heme/Allergies:  Negative for polydipsia.   Psychiatric/Behavioral:  Positive for depression. Negative for hallucinations, substance abuse and suicidal ideas. The patient is nervous/anxious. The patient does not have insomnia.         Current Evaluation:     Nutritional Screening: Considering the patient's height and weight, medications, medical history and preferences, should a referral be made to the dietitian? no    Constitutional  Vitals:  Most recent vital signs, dated less than 90 days prior to this appointment, were reviewed.    There were no vitals filed for this visit.  BP (!) 156/65   Pulse (!) 56   Ht 5' 5" (1.651 m)   Wt 104.9 kg (231 lb 2.4 oz)   BMI 38.47 kg/m²      General:  age appropriate, casually dressed, neatly groomed, overweight     Musculoskeletal  Muscle Strength/Tone:  not examined   Gait & Station:  non-ataxic     Psychiatric  Speech:  no latency; no press   Mood & Affect:  anxious  mood-congruent   Thought Process:  normal and logical   Associations:  intact   Thought Content:  normal, no suicidality, no homicidality, delusions, or paranoia   Insight:  intact, has awareness of illness   Judgement: behavior is adequate to circumstances   Orientation:  grossly intact, person, place, situation, month of year, year   Memory: intact for content of interview, memory >3 objects at five mins   Language: grossly intact, able to name, able to repeat   Attention Span & Concentration:  able to focus   Fund of Knowledge:  intact and appropriate to age and level of education, 4 of 4 recent presidents       Relevant Elements of Neurological Exam: normal gait    Functioning in Relationships:  Spouse/partner:   Peers: Has healthy support group  Employers: [employer]    Laboratory Data  Last laboratory data reviewed.      Medications  Outpatient Encounter Medications as of " 1/29/2024   Medication Sig Dispense Refill    aspirin (ECOTRIN) 81 MG EC tablet TAKE 1 TABLET BY MOUTH EVERY DAY 90 tablet 3    ergocalciferol (ERGOCALCIFEROL) 50,000 unit Cap Take 1 capsule (50,000 Units total) by mouth every 7 days. 12 capsule 0    EScitalopram oxalate (LEXAPRO) 20 MG tablet Take 1 tablet (20 mg total) by mouth once daily. 90 tablet 0    levothyroxine (SYNTHROID) 25 MCG tablet Take 1 tablet (25 mcg total) by mouth before breakfast. 30 tablet 2    loratadine (CLARITIN) 10 mg tablet Take 10 mg by mouth.      pravastatin (PRAVACHOL) 10 MG tablet Take 1 tablet (10 mg total) by mouth once daily. 90 tablet 0    traZODone (DESYREL) 50 MG tablet Take 1 tablet (50 mg total) by mouth nightly. 30 tablet 0    triamcinolone acetonide 0.1% (KENALOG) 0.1 % cream Apply topically 2 (two) times daily.         Assessment - Diagnosis - Goals:     Impression: 63 y.o. female with a history of depressiona and anxiety who presents with complaints of intermittent verbal aggression. She currently is interested in starting therapy before trial of medication adjustment or changes.     1. Intermittent explosive disorder in adult  F63.81      2. Rage attacks  Ambulatory referral/consult to Psychiatry           Strengths and Liabilities: Strength: Patient accepts guidance/feedback, Strength: Patient is expressive/articulate., Strength: Patient is motivated for change., Strength: Patient is physically healthy., Liability: Patient lacks coping skills.    Treatment Goals:  Specify outcomes written in observable, behavioral terms:   Anxiety: acquiring relapse prevention skills and reducing time spent worrying (<30 minutes/day)    Treatment Plan/Recommendations:   Provided information regarding diagnosis; reviewed medically reasonable alternatives for treatment; reviewed risks and benefits of each alternative and made reasonable effort to ascertain the individual understands the information.  Reviewed risks and benefits of  medication; reviewed risks and benefits of not receiving the medication and made reasonable effort to ascertain the individual understands the information  Provided opportunity for individual to ask questions to gain a better understanding of the medical treatment in order to proceed or refuse and made reasonable effort to confirm the individual understands the information.    Continue escitalopram (lexapro) 20 mg; Take 1 tablet; by mouth; daily;   Continue trazodone (desyrel) 50 mg; take 1 tablet; by mouth; nightly;  Referral to [facility] for anxiety  Instructed client to take all medications as prescribed, do not stop taking them abruptly, and call clinic for any issues/concerns.   Provided other tips that may help with treatment:  -Try to be active and exercise.  -Set realistic goals for yourself.  -Try to spend time with other people and confide in a trusted friend or relative.  -Try not to isolate yourself, and let others help you.  -Expect your mood to improve gradually, not immediately.  -Discuss decisions with others who know you well and have a more objective view of your situation.    Call or message provider for additional questions or concerns.     Safety Plan:   Patient has been educated on safety plan should any SI/HI, medication side effects &/or emergency arise. Patient verbalized understanding and agreement to contact a trusted friend or loved one, contact provider's office, call 911 or go to nearest ER should any emergency occur.       Return to Clinic: 3 months    KARIE Duncan

## 2024-01-30 ENCOUNTER — TELEPHONE (OUTPATIENT)
Dept: PSYCHIATRY | Facility: CLINIC | Age: 64
End: 2024-01-30
Payer: COMMERCIAL

## 2024-01-30 NOTE — TELEPHONE ENCOUNTER
"----- Message from Geovanna Cardenas NP sent at 1/29/2024  4:07 PM CST -----  Jb Manzano!    The following patient is interested in attending [facility] therapy for verbal aggression. She is aware that her behaivor is unacceptable and she denies any physical aggression. During her appointment she was very sweet. Reports she can not do "stupid". Reports issues with talking (aggressively) to employees at her work. Let me know if you need any additional information.    ThanksGeovanna"

## 2024-02-29 ENCOUNTER — PATIENT MESSAGE (OUTPATIENT)
Dept: PSYCHIATRY | Facility: CLINIC | Age: 64
End: 2024-02-29
Payer: COMMERCIAL

## 2024-03-18 ENCOUNTER — OFFICE VISIT (OUTPATIENT)
Dept: PSYCHIATRY | Facility: CLINIC | Age: 64
End: 2024-03-18
Payer: COMMERCIAL

## 2024-03-18 DIAGNOSIS — F63.81 INTERMITTENT EXPLOSIVE DISORDER IN ADULT: Primary | ICD-10-CM

## 2024-03-18 PROCEDURE — 90791 PSYCH DIAGNOSTIC EVALUATION: CPT | Mod: S$GLB,,, | Performed by: SOCIAL WORKER

## 2024-03-18 PROCEDURE — 99999 PR PBB SHADOW E&M-EST. PATIENT-LVL I: CPT | Mod: PBBFAC,,, | Performed by: SOCIAL WORKER

## 2024-03-18 PROCEDURE — 3044F HG A1C LEVEL LT 7.0%: CPT | Mod: CPTII,S$GLB,, | Performed by: SOCIAL WORKER

## 2024-03-20 NOTE — PROGRESS NOTES
"Psychiatry Initial Visit (PhD/LCSW)  Diagnostic Interview - CPT 61371      Date: 3/18/2024    Site: [facility]    Referral source: self    Clinical status of patient: Outpatient    Destiny Carson, a 63 y.o. female, for initial evaluation visit.  Met with patient.    Chief complaint/reason for encounter: anger    History of present illness: Ms. Carson is a 63 year old female who is pursuing therapy to deal with and improve issues related to her anger.    Pain: 0    Symptoms:   Mood: denied  Anxiety: Pt states she can never calm down and it is getting worse as she gets older.  She gets angered easily and then will go over it in her mind the rest of the day thinking of more things she could have said.  She feels she cannot stop this behavior and feels powerless.  Substance abuse: denied  Cognitive functioning: denied  Health behaviors: noncontributory    Psychiatric history: currently under psychiatric care    Medical history: noncontributory    Family history of psychiatric illness: not known    Social history (marriage, employment, etc.): Ms. Carson was raised in [place].  She was the 3rd of 6 children.  Her youngest sibling was handicapped and her mother spent all her time caring for him.  Her mother screamed a lot growing up but she knows she was loved and did not experience any significant trauma.  She was  once at a young age and .  She then had her daughter, now 37, out of wedlock and raised her alone.  Her daughter is her "pride and bao".  Pt has not had a partner in 34 years.  The 2 siblings she is closest to live out of town.      Substance use:   Alcohol: none   Drugs: none   Tobacco: none   Caffeine: none    Current medications and drug reactions (include OTC, herbal): see medication list:  pt is on Lexapro    Strengths and liabilities: Strength: Patient accepts guidance/feedback, Strength: Patient is expressive/articulate., Strength: Patient is intelligent., Strength: Patient is " motivated for change., Strength: Patient is physically healthy., Strength: Patient has positive support network., Strength: Patient is stable., Liability: Patient is impulsive., Liability: Patient lacks coping skills.    Current Evaluation:     Mental Status Exam:  General Appearance:  unremarkable, age appropriate   Speech: normal tone, normal rate, normal pitch, normal volume      Level of Cooperation: cooperative      Thought Processes: normal and logical   Mood: euthymic      Thought Content: normal, no suicidality, no homicidality, delusions, or paranoia   Affect: congruent and appropriate   Orientation: Oriented x3   Memory: intact   Attention Span & Concentration: intact   Fund of General Knowledge: intact and appropriate to age and level of education   Abstract Reasoning: Did not assess   Judgment & Insight: fair     Language  intact     Diagnostic Impression - Plan:       ICD-10-CM ICD-9-CM   1. Intermittent explosive disorder in adult  F63.81 312.34       Plan:individual psychotherapy and medication management by physician.  Pt will be [facility] to [facility].    Return to Clinic: 1 week    Length of Service (minutes): 45

## 2024-03-25 ENCOUNTER — OFFICE VISIT (OUTPATIENT)
Dept: PSYCHIATRY | Facility: CLINIC | Age: 64
End: 2024-03-25
Payer: COMMERCIAL

## 2024-03-25 DIAGNOSIS — F63.81 INTERMITTENT EXPLOSIVE DISORDER IN ADULT: Primary | ICD-10-CM

## 2024-03-25 PROCEDURE — 90834 PSYTX W PT 45 MINUTES: CPT | Mod: S$GLB,,, | Performed by: SOCIAL WORKER

## 2024-03-25 PROCEDURE — 99999 PR PBB SHADOW E&M-EST. PATIENT-LVL I: CPT | Mod: PBBFAC,,, | Performed by: SOCIAL WORKER

## 2024-03-25 PROCEDURE — 3044F HG A1C LEVEL LT 7.0%: CPT | Mod: CPTII,S$GLB,, | Performed by: SOCIAL WORKER

## 2024-03-25 NOTE — PROGRESS NOTES
"Individual Psychotherapy (PhD/LCSW)    3/25/2024    Site:  [facility]         Therapeutic Intervention: Met with patient.  Outpatient - Insight oriented psychotherapy 45 min - CPT code 61446    Chief complaint/reason for encounter: anger     Interval history and content of current session: Pt has a long history of getting explosively angry at co-workers who do not do their job.  She reports that she "lost it" last Monday and called her assistant a lot of bad names.  The woman reported her to  and they had a meeting on Friday which was not to her liking.  Pt states she has complained about the woman's lack of ability for the past year although human resources says they have never heard about this.  She claims she is over her anger today as she is able to let go of it once it is all out.  She identifies that she feels out of control with her anger at work but never at home or elsewhere.  She is serious about her job and feels highly responsible for getting it right.    Treatment plan:  Target symptoms: work stress  Why chosen therapy is appropriate versus another modality: relevant to diagnosis  Outcome monitoring methods: self-report, observation, checklist/rating scale  Therapeutic intervention type: insight oriented psychotherapy    Risk parameters:  Patient reports no suicidal ideation  Patient reports no homicidal ideation  Patient reports no self-injurious behavior  Patient reports no violent behavior    Verbal deficits: None    Patient's response to intervention:  The patient's response to intervention is accepting.    Progress toward goals and other mental status changes:  The patient's progress toward goals is fair .    Diagnosis:     ICD-10-CM ICD-9-CM   1. Intermittent explosive disorder in adult  F63.81 312.34       Plan:  individual psychotherapy and medication management by physician    Return to clinic: 2 weeks    Length of Service (minutes): 45 minutes        "

## 2024-04-08 ENCOUNTER — OFFICE VISIT (OUTPATIENT)
Dept: PSYCHIATRY | Facility: CLINIC | Age: 64
End: 2024-04-08
Payer: COMMERCIAL

## 2024-04-08 DIAGNOSIS — F63.81 INTERMITTENT EXPLOSIVE DISORDER IN ADULT: Primary | ICD-10-CM

## 2024-04-08 PROCEDURE — 3044F HG A1C LEVEL LT 7.0%: CPT | Mod: CPTII,S$GLB,, | Performed by: SOCIAL WORKER

## 2024-04-08 PROCEDURE — 90834 PSYTX W PT 45 MINUTES: CPT | Mod: S$GLB,,, | Performed by: SOCIAL WORKER

## 2024-04-08 PROCEDURE — 99999 PR PBB SHADOW E&M-EST. PATIENT-LVL I: CPT | Mod: PBBFAC,,, | Performed by: SOCIAL WORKER

## 2024-04-09 NOTE — PROGRESS NOTES
[facility]  Individual Psychotherapy (PhD/LCSW)    4/8/2024    [facility]         Therapeutic Intervention: Met with patient.  Outpatient - Behavior modifying psychotherapy 45 min - CPT code 23140    Chief complaint/reason for encounter: anger     Interval history and content of current session: Pt states she does well away from work but gets intensely angry with a co-worker who does not do her job correctly and causes extra work for pt.  She feels she cannot control her mouth and says mean things to this woman and pushes her buttons on purpose to make her mad. She is hopeful the woman is going to be fired soon.    [facility], Session 1 (Treatment Initiation)  Session Focus:  Brief check-in  Set agenda  Collect rating scales ___xx__  Treatment Plan/Goals  Review Values and Aspirations  Intervention techniques (if able): ____  Address patient concerns  Summarize session  Feedback about session    Action Plan:  Review therapy materials  Intervention practice: ___materials given to review__  Ways to overcome obstacles: _____  ______      XX/XX/XXXX    Overall Treatment Plan: _____   Values and Aspirations: _____    Problem List / Patients Goals and Evidence-Based Interventions (include up to 5):    Problem #1: __control angry comments_   Goal: _learn alternative stratgies to deal with rising anger __   Therapy Interventions: _relaxation techniques__   Non-Therapy Strategies: ___    Problem #2: ___   Goal: ___   Therapy Interventions: ___   Non-Therapy Strategies: ___    Problem #3: ___   Goal: ___   Therapy Interventions: ___   Non-Therapy Strategies: ___    Problem #4:   Goal: ___   Therapy Interventions: ___   Non-Therapy Strategies: ___    Problem #5:   Goal: ___   Therapy Interventions: ___   Non-Therapy Strategies: ___        Treatment plan:  Target symptoms: work stress  Why chosen therapy is appropriate versus another modality: evidence based practice  Outcome monitoring methods: self-report,  observation, checklist/rating scale  Therapeutic intervention type: behavior modifying psychotherapy    Risk parameters:  Patient reports no suicidal ideation  Patient reports no homicidal ideation  Patient reports no self-injurious behavior  Patient reports no violent behavior    Verbal deficits: None    Patient's response to intervention:  The patient's response to intervention is motivated.    Progress toward goals and other mental status changes:  The patient's progress toward goals is fair .    Diagnosis:     ICD-10-CM ICD-9-CM   1. Intermittent explosive disorder in adult  F63.81 312.34       Plan:  individual psychotherapy    Return to clinic: 1 week    Length of Service (minutes): 45

## 2024-04-15 ENCOUNTER — OFFICE VISIT (OUTPATIENT)
Dept: PSYCHIATRY | Facility: CLINIC | Age: 64
End: 2024-04-15
Payer: COMMERCIAL

## 2024-04-15 DIAGNOSIS — F63.81 INTERMITTENT EXPLOSIVE DISORDER IN ADULT: Primary | ICD-10-CM

## 2024-04-15 PROCEDURE — 90834 PSYTX W PT 45 MINUTES: CPT | Mod: S$GLB,,, | Performed by: SOCIAL WORKER

## 2024-04-15 PROCEDURE — 99999 PR PBB SHADOW E&M-EST. PATIENT-LVL I: CPT | Mod: PBBFAC,,, | Performed by: SOCIAL WORKER

## 2024-04-15 PROCEDURE — 3044F HG A1C LEVEL LT 7.0%: CPT | Mod: CPTII,S$GLB,, | Performed by: SOCIAL WORKER

## 2024-04-22 ENCOUNTER — OFFICE VISIT (OUTPATIENT)
Dept: PSYCHIATRY | Facility: CLINIC | Age: 64
End: 2024-04-22
Payer: COMMERCIAL

## 2024-04-22 DIAGNOSIS — F63.81 INTERMITTENT EXPLOSIVE DISORDER IN ADULT: Primary | ICD-10-CM

## 2024-04-22 PROCEDURE — 3044F HG A1C LEVEL LT 7.0%: CPT | Mod: CPTII,S$GLB,, | Performed by: SOCIAL WORKER

## 2024-04-22 PROCEDURE — 99999 PR PBB SHADOW E&M-EST. PATIENT-LVL I: CPT | Mod: PBBFAC,,, | Performed by: SOCIAL WORKER

## 2024-04-22 PROCEDURE — 90834 PSYTX W PT 45 MINUTES: CPT | Mod: S$GLB,,, | Performed by: SOCIAL WORKER

## 2024-04-22 NOTE — PROGRESS NOTES
[facility]  Individual Psychotherapy (PhD/LCSW)    4/15/2024    Site:  [facility]         Therapeutic Intervention: Met with patient.  Outpatient - Behavior modifying psychotherapy 45 min - CPT code 78504    Chief complaint/reason for encounter: anger     Interval history and content of current session: Pt seen for STep visit.  She talks about work stress with co-worker and is able to see how she aggravates the problem with her angry comments.  She filled out forms on her automatic thoughts and unhelpful thinking styles and identifies that she wants her way all the time but she also feels she has made too many bad decisions in her life.    Treatment plan:  Target symptoms: work stress  Why chosen therapy is appropriate versus another modality: evidence based practice  Outcome monitoring methods: self-report, observation, checklist/rating scale  Therapeutic intervention type: behavior modifying psychotherapy    Risk parameters:  Patient reports no suicidal ideation  Patient reports no homicidal ideation  Patient reports no self-injurious behavior  Patient reports no violent behavior    Verbal deficits: None    Patient's response to intervention:  The patient's response to intervention is motivated.    Progress toward goals and other mental status changes:  The patient's progress toward goals is fair .    Diagnosis:     ICD-10-CM ICD-9-CM   1. Intermittent explosive disorder in adult  F63.81 312.34       Plan:  individual psychotherapy    Return to clinic: 1 week    Length of Service (minutes): 45

## 2024-04-23 NOTE — PROGRESS NOTES
[facility]  Individual Psychotherapy (PhD/LCSW)    4/22/2024    Site:  [facility]         Therapeutic Intervention: Met with patient.  Outpatient - Behavior modifying psychotherapy 45 min - CPT code 31390    Chief complaint/reason for encounter: anger     Interval history and content of current session: Pt seen for STep visit.  She talks about work stress since difficult co-worker was let go.  Worked on her negative automatic thoughts that she is not good enough and not a good person. She was given handouts on dealing with challenging automatic thoughts and will do those for next visit.    Treatment plan:  Target symptoms: work stress  Why chosen therapy is appropriate versus another modality: evidence based practice  Outcome monitoring methods: self-report, observation, checklist/rating scale  Therapeutic intervention type: behavior modifying psychotherapy    Risk parameters:  Patient reports no suicidal ideation  Patient reports no homicidal ideation  Patient reports no self-injurious behavior  Patient reports no violent behavior    Verbal deficits: None    Patient's response to intervention:  The patient's response to intervention is motivated.    Progress toward goals and other mental status changes:  The patient's progress toward goals is fair .    Diagnosis:     ICD-10-CM ICD-9-CM   1. Intermittent explosive disorder in adult  F63.81 312.34       Plan:  individual psychotherapy    Return to clinic: 1 week    Length of Service (minutes): 45

## 2024-04-29 ENCOUNTER — OFFICE VISIT (OUTPATIENT)
Dept: PSYCHIATRY | Facility: CLINIC | Age: 64
End: 2024-04-29
Payer: COMMERCIAL

## 2024-04-29 DIAGNOSIS — F63.81 INTERMITTENT EXPLOSIVE DISORDER IN ADULT: Primary | ICD-10-CM

## 2024-04-29 PROCEDURE — 90834 PSYTX W PT 45 MINUTES: CPT | Mod: S$GLB,,, | Performed by: SOCIAL WORKER

## 2024-04-29 PROCEDURE — 3044F HG A1C LEVEL LT 7.0%: CPT | Mod: CPTII,S$GLB,, | Performed by: SOCIAL WORKER

## 2024-04-29 PROCEDURE — 99999 PR PBB SHADOW E&M-EST. PATIENT-LVL I: CPT | Mod: PBBFAC,,, | Performed by: SOCIAL WORKER

## 2024-04-29 NOTE — PROGRESS NOTES
[facility]  Individual Psychotherapy (PhD/LCSW)    4/29/2024    Site:  [facility]         Therapeutic Intervention: Met with patient.  Outpatient - Behavior modifying psychotherapy 45 min - CPT code 87481    Chief complaint/reason for encounter: anger     Interval history and content of current session: Pt seen for STep visit.  She talks about exploding with anger at one of her bosses when he suggested something that was not going to work and ignored her input about what needed to be done. Worked on her automatic thoughts about her explosions,such as I need to be right and I need to fix it.  She can see the fallacy of her thoughts but gets so overwhelmed by her emotions that she is unable to control her reactions.    Treatment plan:  Target symptoms: work stress  Why chosen therapy is appropriate versus another modality: evidence based practice  Outcome monitoring methods: self-report, observation, checklist/rating scale  Therapeutic intervention type: behavior modifying psychotherapy    Risk parameters:  Patient reports no suicidal ideation  Patient reports no homicidal ideation  Patient reports no self-injurious behavior  Patient reports no violent behavior    Verbal deficits: None    Patient's response to intervention:  The patient's response to intervention is motivated.    Progress toward goals and other mental status changes:  The patient's progress toward goals is fair .    Diagnosis:     ICD-10-CM ICD-9-CM   1. Intermittent explosive disorder in adult  F63.81 312.34       Plan:  individual psychotherapy    Return to clinic: 1 week    Length of Service (minutes): 45

## 2024-05-13 ENCOUNTER — PATIENT MESSAGE (OUTPATIENT)
Dept: PSYCHIATRY | Facility: CLINIC | Age: 64
End: 2024-05-13
Payer: COMMERCIAL

## 2024-05-22 ENCOUNTER — TELEPHONE (OUTPATIENT)
Dept: PSYCHIATRY | Facility: CLINIC | Age: 64
End: 2024-05-22
Payer: COMMERCIAL

## 2024-05-22 NOTE — TELEPHONE ENCOUNTER
I called Ms. Carson to discuss pausing [facility] treatment. She was agreeable with pausing STeP treatment. She is unable to attend consistent appointments during working business hours. She was agreeable with Eula Rodriguez LMSW, sending resources for treatment to her. If able, she would like after-hours or weekend appointments. She was informed most [facility] providers will only have business hours, but there could be some resources in the community with more options.

## 2024-05-29 ENCOUNTER — PATIENT MESSAGE (OUTPATIENT)
Dept: PSYCHIATRY | Facility: CLINIC | Age: 64
End: 2024-05-29
Payer: COMMERCIAL

## 2024-09-20 DIAGNOSIS — R94.39 ABNORMAL CARDIOVASCULAR STRESS TEST: ICD-10-CM

## 2024-09-20 RX ORDER — ASPIRIN 81 MG/1
81 TABLET ORAL
Qty: 90 TABLET | Refills: 0 | Status: SHIPPED | OUTPATIENT
Start: 2024-09-20

## 2024-11-26 ENCOUNTER — TELEPHONE (OUTPATIENT)
Dept: PULMONOLOGY | Facility: CLINIC | Age: 64
End: 2024-11-26
Payer: COMMERCIAL